# Patient Record
Sex: FEMALE | Race: WHITE | NOT HISPANIC OR LATINO
[De-identification: names, ages, dates, MRNs, and addresses within clinical notes are randomized per-mention and may not be internally consistent; named-entity substitution may affect disease eponyms.]

---

## 2017-11-10 ENCOUNTER — APPOINTMENT (OUTPATIENT)
Dept: ULTRASOUND IMAGING | Facility: HOSPITAL | Age: 82
End: 2017-11-10
Payer: MEDICARE

## 2017-11-10 ENCOUNTER — OUTPATIENT (OUTPATIENT)
Dept: OUTPATIENT SERVICES | Facility: HOSPITAL | Age: 82
LOS: 1 days | End: 2017-11-10
Payer: MEDICARE

## 2017-11-10 ENCOUNTER — APPOINTMENT (OUTPATIENT)
Dept: MAMMOGRAPHY | Facility: HOSPITAL | Age: 82
End: 2017-11-10
Payer: MEDICARE

## 2017-11-10 PROCEDURE — 76641 ULTRASOUND BREAST COMPLETE: CPT

## 2017-11-10 PROCEDURE — 77067 SCR MAMMO BI INCL CAD: CPT

## 2017-11-10 PROCEDURE — 77063 BREAST TOMOSYNTHESIS BI: CPT

## 2017-11-10 PROCEDURE — 76641 ULTRASOUND BREAST COMPLETE: CPT | Mod: 26

## 2017-11-10 PROCEDURE — 77063 BREAST TOMOSYNTHESIS BI: CPT | Mod: 26

## 2017-11-10 PROCEDURE — G0202: CPT | Mod: 26

## 2019-04-22 ENCOUNTER — APPOINTMENT (OUTPATIENT)
Dept: ULTRASOUND IMAGING | Facility: HOSPITAL | Age: 84
End: 2019-04-22

## 2019-04-22 ENCOUNTER — APPOINTMENT (OUTPATIENT)
Dept: MAMMOGRAPHY | Facility: HOSPITAL | Age: 84
End: 2019-04-22
Payer: MEDICARE

## 2019-04-22 ENCOUNTER — OUTPATIENT (OUTPATIENT)
Dept: OUTPATIENT SERVICES | Facility: HOSPITAL | Age: 84
LOS: 1 days | End: 2019-04-22
Payer: MEDICARE

## 2019-04-22 DIAGNOSIS — Z00.8 ENCOUNTER FOR OTHER GENERAL EXAMINATION: ICD-10-CM

## 2019-04-22 PROCEDURE — 76641 ULTRASOUND BREAST COMPLETE: CPT | Mod: 26,50

## 2019-04-22 PROCEDURE — 77067 SCR MAMMO BI INCL CAD: CPT

## 2019-04-22 PROCEDURE — 77063 BREAST TOMOSYNTHESIS BI: CPT

## 2019-04-22 PROCEDURE — 77067 SCR MAMMO BI INCL CAD: CPT | Mod: 26

## 2019-04-22 PROCEDURE — 76641 ULTRASOUND BREAST COMPLETE: CPT

## 2019-04-22 PROCEDURE — 77063 BREAST TOMOSYNTHESIS BI: CPT | Mod: 26

## 2020-11-23 ENCOUNTER — NON-APPOINTMENT (OUTPATIENT)
Age: 85
End: 2020-11-23

## 2020-11-23 LAB — SARS-COV-2 N GENE NPH QL NAA+PROBE: NOT DETECTED

## 2021-02-16 ENCOUNTER — APPOINTMENT (OUTPATIENT)
Dept: INTERNAL MEDICINE | Facility: CLINIC | Age: 86
End: 2021-02-16
Payer: MEDICARE

## 2021-02-16 ENCOUNTER — NON-APPOINTMENT (OUTPATIENT)
Age: 86
End: 2021-02-16

## 2021-02-16 VITALS
WEIGHT: 104 LBS | HEIGHT: 62 IN | BODY MASS INDEX: 19.14 KG/M2 | DIASTOLIC BLOOD PRESSURE: 82 MMHG | HEART RATE: 76 BPM | SYSTOLIC BLOOD PRESSURE: 128 MMHG | TEMPERATURE: 96.8 F | OXYGEN SATURATION: 99 %

## 2021-02-16 DIAGNOSIS — Z83.2 FAMILY HISTORY OF DISEASES OF THE BLOOD AND BLOOD-FORMING ORGANS AND CERTAIN DISORDERS INVOLVING THE IMMUNE MECHANISM: ICD-10-CM

## 2021-02-16 DIAGNOSIS — D68.51 ACTIVATED PROTEIN C RESISTANCE: ICD-10-CM

## 2021-02-16 DIAGNOSIS — E55.9 VITAMIN D DEFICIENCY, UNSPECIFIED: ICD-10-CM

## 2021-02-16 DIAGNOSIS — Z82.49 FAMILY HISTORY OF ISCHEMIC HEART DISEASE AND OTHER DISEASES OF THE CIRCULATORY SYSTEM: ICD-10-CM

## 2021-02-16 DIAGNOSIS — Z80.3 FAMILY HISTORY OF MALIGNANT NEOPLASM OF BREAST: ICD-10-CM

## 2021-02-16 DIAGNOSIS — Z82.69 FAMILY HISTORY OF OTHER DISEASES OF THE MUSCULOSKELETAL SYSTEM AND CONNECTIVE TISSUE: ICD-10-CM

## 2021-02-16 DIAGNOSIS — Z80.0 FAMILY HISTORY OF MALIGNANT NEOPLASM OF DIGESTIVE ORGANS: ICD-10-CM

## 2021-02-16 DIAGNOSIS — E53.8 DEFICIENCY OF OTHER SPECIFIED B GROUP VITAMINS: ICD-10-CM

## 2021-02-16 LAB
25(OH)D3 SERPL-MCNC: 66.8 NG/ML
ALBUMIN SERPL ELPH-MCNC: 4.6 G/DL
ALP BLD-CCNC: 74 U/L
ALT SERPL-CCNC: 22 U/L
ANION GAP SERPL CALC-SCNC: 12 MMOL/L
APPEARANCE: CLEAR
AST SERPL-CCNC: 24 U/L
BACTERIA: NEGATIVE
BASOPHILS # BLD AUTO: 0.06 K/UL
BASOPHILS NFR BLD AUTO: 1.6 %
BILIRUB SERPL-MCNC: 0.6 MG/DL
BILIRUBIN URINE: NEGATIVE
BLOOD URINE: NORMAL
BUN SERPL-MCNC: 14 MG/DL
CALCIUM SERPL-MCNC: 9.6 MG/DL
CHLORIDE SERPL-SCNC: 100 MMOL/L
CHOLEST SERPL-MCNC: 213 MG/DL
CO2 SERPL-SCNC: 28 MMOL/L
COLOR: YELLOW
CREAT SERPL-MCNC: 0.77 MG/DL
EOSINOPHIL # BLD AUTO: 0.21 K/UL
EOSINOPHIL NFR BLD AUTO: 5.6 %
GLUCOSE QUALITATIVE U: NEGATIVE
GLUCOSE SERPL-MCNC: 92 MG/DL
HCT VFR BLD CALC: 38.9 %
HDLC SERPL-MCNC: 95 MG/DL
HGB BLD-MCNC: 12 G/DL
HYALINE CASTS: 1 /LPF
IMM GRANULOCYTES NFR BLD AUTO: 0.3 %
KETONES URINE: NEGATIVE
LDLC SERPL CALC-MCNC: 108 MG/DL
LEUKOCYTE ESTERASE URINE: ABNORMAL
LYMPHOCYTES # BLD AUTO: 1.38 K/UL
LYMPHOCYTES NFR BLD AUTO: 36.5 %
MAN DIFF?: NORMAL
MCHC RBC-ENTMCNC: 27 PG
MCHC RBC-ENTMCNC: 30.8 GM/DL
MCV RBC AUTO: 87.6 FL
MICROSCOPIC-UA: NORMAL
MONOCYTES # BLD AUTO: 0.43 K/UL
MONOCYTES NFR BLD AUTO: 11.4 %
NEUTROPHILS # BLD AUTO: 1.69 K/UL
NEUTROPHILS NFR BLD AUTO: 44.6 %
NITRITE URINE: NEGATIVE
NONHDLC SERPL-MCNC: 118 MG/DL
PH URINE: 6.5
PLATELET # BLD AUTO: 222 K/UL
POTASSIUM SERPL-SCNC: 4 MMOL/L
PROT SERPL-MCNC: 6.8 G/DL
PROTEIN URINE: NORMAL
RBC # BLD: 4.44 M/UL
RBC # FLD: 14.3 %
RED BLOOD CELLS URINE: 5 /HPF
SODIUM SERPL-SCNC: 140 MMOL/L
SPECIFIC GRAVITY URINE: 1.02
SQUAMOUS EPITHELIAL CELLS: 2 /HPF
T4 SERPL-MCNC: 6.5 UG/DL
TRIGL SERPL-MCNC: 54 MG/DL
TSH SERPL-ACNC: 2.17 UIU/ML
UROBILINOGEN URINE: NORMAL
WBC # FLD AUTO: 3.78 K/UL
WHITE BLOOD CELLS URINE: 15 /HPF

## 2021-02-16 PROCEDURE — 99214 OFFICE O/P EST MOD 30 MIN: CPT

## 2021-02-16 NOTE — PHYSICAL EXAM
[Normal] : normal rate, regular rhythm, normal S1 and S2 and no murmur heard [de-identified] : bruising of ankle

## 2021-02-16 NOTE — HISTORY OF PRESENT ILLNESS
[FreeTextEntry1] : Pt here for  follow up also for a fal in her kitchen nthat happened last week. Was going to reach up and she climbed   on chair and  fell on her bottom and the chair fell on her . Not painful but   more bruising, [de-identified] : pt  also notes slight headache nd weighs   too much has pain into her right arm

## 2021-02-18 LAB
APPEARANCE: CLEAR
BACTERIA: NEGATIVE
BILIRUBIN URINE: NEGATIVE
BLOOD URINE: NEGATIVE
COLOR: COLORLESS
GLUCOSE QUALITATIVE U: NEGATIVE
HYALINE CASTS: 0 /LPF
KETONES URINE: NEGATIVE
LEUKOCYTE ESTERASE URINE: NEGATIVE
MICROSCOPIC-UA: NORMAL
NITRITE URINE: NEGATIVE
PH URINE: 7.5
PROTEIN URINE: NEGATIVE
RED BLOOD CELLS URINE: 1 /HPF
SPECIFIC GRAVITY URINE: 1.01
SQUAMOUS EPITHELIAL CELLS: 0 /HPF
UROBILINOGEN URINE: NORMAL
WHITE BLOOD CELLS URINE: 0 /HPF

## 2021-02-22 LAB — BACTERIA UR CULT: NORMAL

## 2021-02-25 PROBLEM — D68.51 FACTOR V LEIDEN: Status: ACTIVE | Noted: 2021-02-25

## 2021-02-25 PROBLEM — Z82.69 FAMILY HISTORY OF OSTEOARTHRITIS: Status: ACTIVE | Noted: 2021-02-25

## 2021-02-25 PROBLEM — Z83.2 FAMILY HISTORY OF FACTOR V DEFICIENCY: Status: ACTIVE | Noted: 2021-02-25

## 2021-02-25 PROBLEM — Z82.49 FAMILY HISTORY OF CORONARY ARTERY DISEASE: Status: ACTIVE | Noted: 2021-02-25

## 2021-02-25 PROBLEM — Z80.3 FAMILY HISTORY OF MALIGNANT NEOPLASM OF BREAST: Status: ACTIVE | Noted: 2021-02-25

## 2021-02-25 PROBLEM — Z80.0 FAMILY HISTORY OF MALIGNANT NEOPLASM OF COLON: Status: ACTIVE | Noted: 2021-02-25

## 2021-03-23 ENCOUNTER — OUTPATIENT (OUTPATIENT)
Dept: OUTPATIENT SERVICES | Facility: HOSPITAL | Age: 86
LOS: 1 days | End: 2021-03-23
Payer: MEDICARE

## 2021-03-23 ENCOUNTER — APPOINTMENT (OUTPATIENT)
Dept: MAMMOGRAPHY | Facility: HOSPITAL | Age: 86
End: 2021-03-23
Payer: MEDICARE

## 2021-03-23 ENCOUNTER — APPOINTMENT (OUTPATIENT)
Dept: ULTRASOUND IMAGING | Facility: HOSPITAL | Age: 86
End: 2021-03-23
Payer: MEDICARE

## 2021-03-23 DIAGNOSIS — Z00.8 ENCOUNTER FOR OTHER GENERAL EXAMINATION: ICD-10-CM

## 2021-03-23 PROCEDURE — 76641 ULTRASOUND BREAST COMPLETE: CPT

## 2021-03-23 PROCEDURE — 77063 BREAST TOMOSYNTHESIS BI: CPT | Mod: 26

## 2021-03-23 PROCEDURE — 77067 SCR MAMMO BI INCL CAD: CPT | Mod: 26

## 2021-03-23 PROCEDURE — 76641 ULTRASOUND BREAST COMPLETE: CPT | Mod: 26,50

## 2021-03-23 PROCEDURE — 77067 SCR MAMMO BI INCL CAD: CPT

## 2021-03-23 PROCEDURE — 77063 BREAST TOMOSYNTHESIS BI: CPT

## 2021-04-15 ENCOUNTER — APPOINTMENT (OUTPATIENT)
Dept: RADIOLOGY | Facility: CLINIC | Age: 86
End: 2021-04-15
Payer: MEDICARE

## 2021-04-15 ENCOUNTER — APPOINTMENT (OUTPATIENT)
Dept: INTERNAL MEDICINE | Facility: CLINIC | Age: 86
End: 2021-04-15
Payer: MEDICARE

## 2021-04-15 ENCOUNTER — APPOINTMENT (OUTPATIENT)
Dept: ULTRASOUND IMAGING | Facility: CLINIC | Age: 86
End: 2021-04-15
Payer: MEDICARE

## 2021-04-15 ENCOUNTER — RESULT REVIEW (OUTPATIENT)
Age: 86
End: 2021-04-15

## 2021-04-15 ENCOUNTER — OUTPATIENT (OUTPATIENT)
Dept: OUTPATIENT SERVICES | Facility: HOSPITAL | Age: 86
LOS: 1 days | End: 2021-04-15
Payer: MEDICARE

## 2021-04-15 VITALS
OXYGEN SATURATION: 96 % | WEIGHT: 103 LBS | DIASTOLIC BLOOD PRESSURE: 76 MMHG | BODY MASS INDEX: 18.95 KG/M2 | HEART RATE: 76 BPM | HEIGHT: 62 IN | SYSTOLIC BLOOD PRESSURE: 124 MMHG | TEMPERATURE: 97.5 F | RESPIRATION RATE: 16 BRPM

## 2021-04-15 DIAGNOSIS — M79.605 PAIN IN LEFT LEG: ICD-10-CM

## 2021-04-15 DIAGNOSIS — Z00.8 ENCOUNTER FOR OTHER GENERAL EXAMINATION: ICD-10-CM

## 2021-04-15 PROCEDURE — 93971 EXTREMITY STUDY: CPT | Mod: 26,LT

## 2021-04-15 PROCEDURE — 73610 X-RAY EXAM OF ANKLE: CPT | Mod: 26,LT

## 2021-04-15 PROCEDURE — 73590 X-RAY EXAM OF LOWER LEG: CPT | Mod: 26,LT

## 2021-04-15 PROCEDURE — 99214 OFFICE O/P EST MOD 30 MIN: CPT

## 2021-04-15 PROCEDURE — 73590 X-RAY EXAM OF LOWER LEG: CPT

## 2021-04-15 PROCEDURE — 93971 EXTREMITY STUDY: CPT

## 2021-04-15 PROCEDURE — 73610 X-RAY EXAM OF ANKLE: CPT

## 2021-04-15 NOTE — PHYSICAL EXAM
[Normal] : no respiratory distress, lungs were clear to auscultation bilaterally and no accessory muscle use [Normal Rate] : normal rate  [Regular Rhythm] : with a regular rhythm [de-identified] : _homans neg, +varicosities [de-identified] : tenderness mid tibial region.  + swelling L lateral malleolus.

## 2021-04-15 NOTE — ADDENDUM
[FreeTextEntry1] : \par Left message xray no fracture and doppler negative for dvt.  Advised to see Ortho if symptoms persist.

## 2021-04-15 NOTE — HISTORY OF PRESENT ILLNESS
[FreeTextEntry1] : leg pain [de-identified] : \par Fell 2 months ago- started to climb onto chair but lost balance and chair fell onto her left lower leg.  She continues to have intermittent pain  which can awaken her at night  to L lower leg.  Able to ambulate and bear weight without difficulty. No fevers, chills .  Using Advil prn.      \par UA previously had showed some RBCs.  Repeat Urine tests no RBCs.  \par she will be traveling to [place] in May and requires a Covid swab prior to travel.

## 2021-05-02 LAB — SARS-COV-2 N GENE NPH QL NAA+PROBE: NOT DETECTED

## 2021-05-03 ENCOUNTER — NON-APPOINTMENT (OUTPATIENT)
Age: 86
End: 2021-05-03

## 2021-10-19 LAB
25(OH)D3 SERPL-MCNC: 41.9 NG/ML
ALBUMIN SERPL ELPH-MCNC: 4.7 G/DL
ALP BLD-CCNC: 77 U/L
ALT SERPL-CCNC: 16 U/L
ANION GAP SERPL CALC-SCNC: 10 MMOL/L
APPEARANCE: CLEAR
AST SERPL-CCNC: 21 U/L
BACTERIA: NEGATIVE
BASOPHILS # BLD AUTO: 0.06 K/UL
BASOPHILS NFR BLD AUTO: 1.4 %
BILIRUB SERPL-MCNC: 0.5 MG/DL
BILIRUBIN URINE: NEGATIVE
BLOOD URINE: NEGATIVE
BUN SERPL-MCNC: 16 MG/DL
CALCIUM SERPL-MCNC: 9.7 MG/DL
CHLORIDE SERPL-SCNC: 102 MMOL/L
CHOLEST SERPL-MCNC: 218 MG/DL
CO2 SERPL-SCNC: 31 MMOL/L
COLOR: NORMAL
CREAT SERPL-MCNC: 0.6 MG/DL
EOSINOPHIL # BLD AUTO: 0.15 K/UL
EOSINOPHIL NFR BLD AUTO: 3.6 %
ESTIMATED AVERAGE GLUCOSE: 114 MG/DL
FOLATE SERPL-MCNC: 14.9 NG/ML
GLUCOSE QUALITATIVE U: NEGATIVE
GLUCOSE SERPL-MCNC: 98 MG/DL
HBA1C MFR BLD HPLC: 5.6 %
HCT VFR BLD CALC: 40.3 %
HDLC SERPL-MCNC: 89 MG/DL
HGB BLD-MCNC: 12 G/DL
HYALINE CASTS: 0 /LPF
IMM GRANULOCYTES NFR BLD AUTO: 0.2 %
KETONES URINE: NEGATIVE
LDLC SERPL CALC-MCNC: 115 MG/DL
LEUKOCYTE ESTERASE URINE: ABNORMAL
LYMPHOCYTES # BLD AUTO: 1.63 K/UL
LYMPHOCYTES NFR BLD AUTO: 38.9 %
MAN DIFF?: NORMAL
MCHC RBC-ENTMCNC: 26.8 PG
MCHC RBC-ENTMCNC: 29.8 GM/DL
MCV RBC AUTO: 90 FL
MICROSCOPIC-UA: NORMAL
MONOCYTES # BLD AUTO: 0.45 K/UL
MONOCYTES NFR BLD AUTO: 10.7 %
NEUTROPHILS # BLD AUTO: 1.89 K/UL
NEUTROPHILS NFR BLD AUTO: 45.2 %
NITRITE URINE: NEGATIVE
NONHDLC SERPL-MCNC: 129 MG/DL
PH URINE: 7
PLATELET # BLD AUTO: 233 K/UL
POTASSIUM SERPL-SCNC: 4 MMOL/L
PROT SERPL-MCNC: 6.7 G/DL
PROTEIN URINE: NEGATIVE
RBC # BLD: 4.48 M/UL
RBC # FLD: 14.2 %
RED BLOOD CELLS URINE: 5 /HPF
SODIUM SERPL-SCNC: 144 MMOL/L
SPECIFIC GRAVITY URINE: 1.02
SQUAMOUS EPITHELIAL CELLS: 1 /HPF
T4 SERPL-MCNC: 6.5 UG/DL
TRIGL SERPL-MCNC: 69 MG/DL
TSH SERPL-ACNC: 2.48 UIU/ML
UROBILINOGEN URINE: NORMAL
VIT B12 SERPL-MCNC: 521 PG/ML
WBC # FLD AUTO: 4.19 K/UL
WHITE BLOOD CELLS URINE: 12 /HPF

## 2021-10-25 ENCOUNTER — APPOINTMENT (OUTPATIENT)
Dept: INTERNAL MEDICINE | Facility: CLINIC | Age: 86
End: 2021-10-25
Payer: MEDICARE

## 2021-10-25 VITALS
HEART RATE: 84 BPM | BODY MASS INDEX: 19.32 KG/M2 | WEIGHT: 105 LBS | HEIGHT: 62 IN | TEMPERATURE: 99.2 F | SYSTOLIC BLOOD PRESSURE: 122 MMHG | OXYGEN SATURATION: 98 % | DIASTOLIC BLOOD PRESSURE: 60 MMHG

## 2021-10-25 DIAGNOSIS — R31.9 HEMATURIA, UNSPECIFIED: ICD-10-CM

## 2021-10-25 PROCEDURE — 90662 IIV NO PRSV INCREASED AG IM: CPT

## 2021-10-25 PROCEDURE — G0008: CPT

## 2021-10-25 PROCEDURE — 99214 OFFICE O/P EST MOD 30 MIN: CPT | Mod: 25

## 2021-10-25 NOTE — PHYSICAL EXAM
[No Edema] : there was no peripheral edema [Normal] : no acute distress, well nourished, well developed and well-appearing

## 2021-10-26 ENCOUNTER — NON-APPOINTMENT (OUTPATIENT)
Age: 86
End: 2021-10-26

## 2021-10-26 LAB
APPEARANCE: CLEAR
BACTERIA: NEGATIVE
BILIRUBIN URINE: NEGATIVE
BLOOD URINE: NEGATIVE
COLOR: NORMAL
GLUCOSE QUALITATIVE U: NEGATIVE
HYALINE CASTS: 0 /LPF
KETONES URINE: NEGATIVE
LEUKOCYTE ESTERASE URINE: NEGATIVE
MICROSCOPIC-UA: NORMAL
NITRITE URINE: NEGATIVE
PH URINE: 5.5
PROTEIN URINE: NEGATIVE
RED BLOOD CELLS URINE: 2 /HPF
SPECIFIC GRAVITY URINE: 1.01
SQUAMOUS EPITHELIAL CELLS: 0 /HPF
UROBILINOGEN URINE: NORMAL
WHITE BLOOD CELLS URINE: 0 /HPF

## 2021-10-27 ENCOUNTER — NON-APPOINTMENT (OUTPATIENT)
Age: 86
End: 2021-10-27

## 2021-10-28 LAB — BACTERIA UR CULT: NORMAL

## 2021-11-12 ENCOUNTER — APPOINTMENT (OUTPATIENT)
Dept: INTERNAL MEDICINE | Facility: CLINIC | Age: 86
End: 2021-11-12
Payer: MEDICARE

## 2021-11-12 PROCEDURE — 0013A: CPT

## 2021-11-23 ENCOUNTER — APPOINTMENT (OUTPATIENT)
Dept: RADIOLOGY | Facility: CLINIC | Age: 86
End: 2021-11-23
Payer: MEDICARE

## 2021-11-23 ENCOUNTER — OUTPATIENT (OUTPATIENT)
Dept: OUTPATIENT SERVICES | Facility: HOSPITAL | Age: 86
LOS: 1 days | End: 2021-11-23
Payer: MEDICARE

## 2021-11-23 DIAGNOSIS — Z00.00 ENCOUNTER FOR GENERAL ADULT MEDICAL EXAMINATION WITHOUT ABNORMAL FINDINGS: ICD-10-CM

## 2021-11-23 PROCEDURE — 77085 DXA BONE DENSITY AXL VRT FX: CPT | Mod: 26

## 2021-11-23 PROCEDURE — 77085 DXA BONE DENSITY AXL VRT FX: CPT

## 2021-11-24 ENCOUNTER — NON-APPOINTMENT (OUTPATIENT)
Age: 86
End: 2021-11-24

## 2021-12-12 LAB — SARS-COV-2 N GENE NPH QL NAA+PROBE: NOT DETECTED

## 2021-12-13 ENCOUNTER — NON-APPOINTMENT (OUTPATIENT)
Age: 86
End: 2021-12-13

## 2022-04-21 LAB
25(OH)D3 SERPL-MCNC: 35.8 NG/ML
ALBUMIN SERPL ELPH-MCNC: 5 G/DL
ALP BLD-CCNC: 78 U/L
ALT SERPL-CCNC: 17 U/L
ANION GAP SERPL CALC-SCNC: 12 MMOL/L
AST SERPL-CCNC: 23 U/L
BASOPHILS # BLD AUTO: 0.05 K/UL
BASOPHILS NFR BLD AUTO: 0.9 %
BILIRUB SERPL-MCNC: 0.4 MG/DL
BUN SERPL-MCNC: 16 MG/DL
CALCIUM SERPL-MCNC: 9.6 MG/DL
CHLORIDE SERPL-SCNC: 103 MMOL/L
CHOLEST SERPL-MCNC: 218 MG/DL
CO2 SERPL-SCNC: 28 MMOL/L
CREAT SERPL-MCNC: 0.64 MG/DL
EGFR: 87 ML/MIN/1.73M2
EOSINOPHIL # BLD AUTO: 0.08 K/UL
EOSINOPHIL NFR BLD AUTO: 1.4 %
GLUCOSE SERPL-MCNC: 97 MG/DL
HCT VFR BLD CALC: 40.6 %
HDLC SERPL-MCNC: 101 MG/DL
HGB BLD-MCNC: 12.6 G/DL
IMM GRANULOCYTES NFR BLD AUTO: 0.4 %
LDLC SERPL CALC-MCNC: 107 MG/DL
LYMPHOCYTES # BLD AUTO: 1.45 K/UL
LYMPHOCYTES NFR BLD AUTO: 25.5 %
MAN DIFF?: NORMAL
MCHC RBC-ENTMCNC: 27.2 PG
MCHC RBC-ENTMCNC: 31 GM/DL
MCV RBC AUTO: 87.5 FL
MONOCYTES # BLD AUTO: 0.41 K/UL
MONOCYTES NFR BLD AUTO: 7.2 %
NEUTROPHILS # BLD AUTO: 3.67 K/UL
NEUTROPHILS NFR BLD AUTO: 64.6 %
NONHDLC SERPL-MCNC: 117 MG/DL
PLATELET # BLD AUTO: 224 K/UL
POTASSIUM SERPL-SCNC: 3.8 MMOL/L
PROT SERPL-MCNC: 7.2 G/DL
RBC # BLD: 4.64 M/UL
RBC # FLD: 14.2 %
SODIUM SERPL-SCNC: 143 MMOL/L
T4 SERPL-MCNC: 6.7 UG/DL
TRIGL SERPL-MCNC: 49 MG/DL
TSH SERPL-ACNC: 1.44 UIU/ML
VIT B12 SERPL-MCNC: 660 PG/ML
WBC # FLD AUTO: 5.68 K/UL

## 2022-04-24 LAB
APPEARANCE: CLEAR
BACTERIA: NEGATIVE
BILIRUBIN URINE: NEGATIVE
BLOOD URINE: ABNORMAL
COLOR: YELLOW
GLUCOSE QUALITATIVE U: NEGATIVE
HCV AB SER QL: NONREACTIVE
HCV S/CO RATIO: 0.11 S/CO
HYALINE CASTS: 0 /LPF
KETONES URINE: NEGATIVE
LEUKOCYTE ESTERASE URINE: ABNORMAL
MICROSCOPIC-UA: NORMAL
NITRITE URINE: NEGATIVE
PH URINE: 6
PROTEIN URINE: NORMAL
RED BLOOD CELLS URINE: 5 /HPF
SPECIFIC GRAVITY URINE: 1.02
SQUAMOUS EPITHELIAL CELLS: 1 /HPF
UROBILINOGEN URINE: NORMAL
WHITE BLOOD CELLS URINE: 17 /HPF

## 2022-04-25 LAB — BACTERIA UR CULT: ABNORMAL

## 2022-04-26 ENCOUNTER — NON-APPOINTMENT (OUTPATIENT)
Age: 87
End: 2022-04-26

## 2022-04-26 ENCOUNTER — APPOINTMENT (OUTPATIENT)
Dept: INTERNAL MEDICINE | Facility: CLINIC | Age: 87
End: 2022-04-26
Payer: MEDICARE

## 2022-04-26 VITALS
DIASTOLIC BLOOD PRESSURE: 88 MMHG | SYSTOLIC BLOOD PRESSURE: 142 MMHG | BODY MASS INDEX: 19.32 KG/M2 | HEART RATE: 84 BPM | WEIGHT: 105 LBS | OXYGEN SATURATION: 97 % | HEIGHT: 62 IN | TEMPERATURE: 98.7 F

## 2022-04-26 PROCEDURE — 0013A: CPT

## 2022-04-26 RX ORDER — ACETAMINOPHEN/DIPHENHYDRAMINE 500MG-25MG
1000 TABLET ORAL DAILY
Refills: 0 | Status: DISCONTINUED | COMMUNITY
End: 2022-04-26

## 2022-04-26 NOTE — HEALTH RISK ASSESSMENT
[Never] : Never [Yes] : Yes [4 or more  times a week (4 pts)] : 4 or more  times a week (4 points) [1 or 2 (0 pts)] : 1 or 2 (0 points) [Never (0 pts)] : Never (0 points) [No] : In the past 12 months have you used drugs other than those required for medical reasons? No [0] : 2) Feeling down, depressed, or hopeless: Not at all (0) [No falls in past year] : Patient reported no falls in the past year [PHQ-2 Negative - No further assessment needed] : PHQ-2 Negative - No further assessment needed [de-identified] : no [de-identified] : reg [[id number]] : 0 [EyeExamDate] : 2021 [Alone] : lives alone [Fully functional (bathing, dressing, toileting, transferring, walking, feeding)] : Fully functional (bathing, dressing, toileting, transferring, walking, feeding) [Fully functional (using the telephone, shopping, preparing meals, housekeeping, doing laundry, using] : Fully functional and needs no help or supervision to perform IADLs (using the telephone, shopping, preparing meals, housekeeping, doing laundry, using transportation, managing medications and managing finances) [Reports changes in hearing] : Reports no changes in hearing [Smoke Detector] : smoke detector [Carbon Monoxide Detector] : carbon monoxide detector [Seat Belt] :  uses seat belt [Sunscreen] : uses sunscreen [MammogramDate] : 2021 [BoneDensityDate] : 11/2021 [ColonoscopyDate] : 2002 [With Patient/Caregiver] : , with patient/caregiver [AdvancecareDate] : 4/26/22

## 2022-04-28 LAB
APPEARANCE: CLEAR
BACTERIA: NEGATIVE
BILIRUBIN URINE: NEGATIVE
BLOOD URINE: NEGATIVE
COLOR: NORMAL
GLUCOSE QUALITATIVE U: NEGATIVE
HYALINE CASTS: 0 /LPF
KETONES URINE: NEGATIVE
LEUKOCYTE ESTERASE URINE: NEGATIVE
MICROSCOPIC-UA: NORMAL
NITRITE URINE: NEGATIVE
PH URINE: 6.5
PROTEIN URINE: NEGATIVE
RED BLOOD CELLS URINE: 1 /HPF
SPECIFIC GRAVITY URINE: 1.01
SQUAMOUS EPITHELIAL CELLS: 0 /HPF
UROBILINOGEN URINE: NORMAL
WHITE BLOOD CELLS URINE: 1 /HPF

## 2022-05-01 LAB — BACTERIA UR CULT: NORMAL

## 2022-06-22 ENCOUNTER — APPOINTMENT (OUTPATIENT)
Dept: ULTRASOUND IMAGING | Facility: HOSPITAL | Age: 87
End: 2022-06-22
Payer: MEDICARE

## 2022-06-22 ENCOUNTER — OUTPATIENT (OUTPATIENT)
Dept: OUTPATIENT SERVICES | Facility: HOSPITAL | Age: 87
LOS: 1 days | End: 2022-06-22
Payer: MEDICARE

## 2022-06-22 ENCOUNTER — APPOINTMENT (OUTPATIENT)
Dept: MAMMOGRAPHY | Facility: HOSPITAL | Age: 87
End: 2022-06-22
Payer: MEDICARE

## 2022-06-22 DIAGNOSIS — Z00.8 ENCOUNTER FOR OTHER GENERAL EXAMINATION: ICD-10-CM

## 2022-06-22 PROCEDURE — 77067 SCR MAMMO BI INCL CAD: CPT | Mod: 26

## 2022-06-22 PROCEDURE — 77063 BREAST TOMOSYNTHESIS BI: CPT | Mod: 26

## 2022-06-22 PROCEDURE — 77067 SCR MAMMO BI INCL CAD: CPT

## 2022-06-22 PROCEDURE — 76641 ULTRASOUND BREAST COMPLETE: CPT

## 2022-06-22 PROCEDURE — 76641 ULTRASOUND BREAST COMPLETE: CPT | Mod: 26,50

## 2022-06-22 PROCEDURE — 77063 BREAST TOMOSYNTHESIS BI: CPT

## 2022-12-20 ENCOUNTER — APPOINTMENT (OUTPATIENT)
Dept: ENDOCRINOLOGY | Facility: CLINIC | Age: 87
End: 2022-12-20

## 2023-01-20 ENCOUNTER — APPOINTMENT (OUTPATIENT)
Dept: INTERNAL MEDICINE | Facility: CLINIC | Age: 88
End: 2023-01-20
Payer: MEDICARE

## 2023-01-20 VITALS
SYSTOLIC BLOOD PRESSURE: 124 MMHG | TEMPERATURE: 97.9 F | OXYGEN SATURATION: 97 % | HEIGHT: 62 IN | HEART RATE: 86 BPM | DIASTOLIC BLOOD PRESSURE: 64 MMHG | WEIGHT: 105 LBS | BODY MASS INDEX: 19.32 KG/M2

## 2023-01-20 DIAGNOSIS — M54.50 LOW BACK PAIN, UNSPECIFIED: ICD-10-CM

## 2023-01-20 PROCEDURE — 99215 OFFICE O/P EST HI 40 MIN: CPT

## 2023-01-22 ENCOUNTER — TRANSCRIPTION ENCOUNTER (OUTPATIENT)
Age: 88
End: 2023-01-22

## 2023-01-24 DIAGNOSIS — Z11.52 ENCOUNTER FOR SCREENING FOR COVID-19: ICD-10-CM

## 2023-01-25 ENCOUNTER — APPOINTMENT (OUTPATIENT)
Dept: INTERNAL MEDICINE | Facility: CLINIC | Age: 88
End: 2023-01-25
Payer: MEDICARE

## 2023-01-25 ENCOUNTER — NON-APPOINTMENT (OUTPATIENT)
Age: 88
End: 2023-01-25

## 2023-01-25 VITALS
BODY MASS INDEX: 19.32 KG/M2 | TEMPERATURE: 98.8 F | SYSTOLIC BLOOD PRESSURE: 142 MMHG | OXYGEN SATURATION: 98 % | HEART RATE: 81 BPM | HEIGHT: 62 IN | WEIGHT: 105 LBS | DIASTOLIC BLOOD PRESSURE: 72 MMHG

## 2023-01-25 LAB
ANION GAP SERPL CALC-SCNC: 11 MMOL/L
BASOPHILS # BLD AUTO: 0.05 K/UL
BASOPHILS NFR BLD AUTO: 1 %
BUN SERPL-MCNC: 23 MG/DL
CALCIUM SERPL-MCNC: 9.8 MG/DL
CHLORIDE SERPL-SCNC: 104 MMOL/L
CO2 SERPL-SCNC: 29 MMOL/L
CREAT SERPL-MCNC: 0.68 MG/DL
EGFR: 85 ML/MIN/1.73M2
EOSINOPHIL # BLD AUTO: 0.16 K/UL
EOSINOPHIL NFR BLD AUTO: 3 %
GLUCOSE SERPL-MCNC: 90 MG/DL
HCT VFR BLD CALC: 36.1 %
HGB BLD-MCNC: 11.7 G/DL
IMM GRANULOCYTES NFR BLD AUTO: 0.4 %
LYMPHOCYTES # BLD AUTO: 1.54 K/UL
LYMPHOCYTES NFR BLD AUTO: 29.3 %
MAN DIFF?: NORMAL
MCHC RBC-ENTMCNC: 27.2 PG
MCHC RBC-ENTMCNC: 32.4 GM/DL
MCV RBC AUTO: 84 FL
MONOCYTES # BLD AUTO: 0.49 K/UL
MONOCYTES NFR BLD AUTO: 9.3 %
NEUTROPHILS # BLD AUTO: 2.99 K/UL
NEUTROPHILS NFR BLD AUTO: 57 %
PLATELET # BLD AUTO: 233 K/UL
POTASSIUM SERPL-SCNC: 4.2 MMOL/L
RBC # BLD: 4.3 M/UL
RBC # FLD: 14.6 %
SODIUM SERPL-SCNC: 145 MMOL/L
WBC # FLD AUTO: 5.25 K/UL

## 2023-01-25 PROCEDURE — 93000 ELECTROCARDIOGRAM COMPLETE: CPT

## 2023-01-25 PROCEDURE — 99214 OFFICE O/P EST MOD 30 MIN: CPT | Mod: 25

## 2023-01-25 NOTE — HISTORY OF PRESENT ILLNESS
[No Adverse Anesthesia Reaction] : no adverse anesthesia reaction in self or family member [(Patient denies any chest pain, claudication, dyspnea on exertion, orthopnea, palpitations or syncope)] : Patient denies any chest pain, claudication, dyspnea on exertion, orthopnea, palpitations or syncope [Family Member] : no family member with adverse anesthesia reaction/sudden death [Self] : no previous adverse anesthesia reaction [FreeTextEntry1] : Left Cataract Repair [FreeTextEntry2] : 1/30/23 [FreeTextEntry3] : Dr.Aimee Cruz [FreeTextEntry4] : Ms. FADY HARPER is a 87 year F who comes in for a preoperative evaluation.\par Pt with history of vitamin b12 deficiency, vitamin d deficiency comes in for preop visit for Cataract repair. \par Pt recently traveled to [place] and saw doctor there for her hx of low back pain and recommended PT. \par Patient denies any cp, sob,abdominal pain, nausea, vomiting, palpitations, fever, chills, constipation, diarrhea.\par \par Anesthesia History: Ms. FADY HARPER has had no adverse effects to anesthesia in the past. \par \par Functional Capacity-Walking 1-2 blocks or climbing stairs symptoms: Ms. FADY HARPER denies any symptoms of chest pain, HACKETT, SOB or palpitations.\par \par

## 2023-01-25 NOTE — REVIEW OF SYSTEMS
[Negative] : Neurological [Fever] : no fever [Chills] : no chills [Fatigue] : no fatigue [FreeTextEntry3] : see HPi

## 2023-01-25 NOTE — HISTORY OF PRESENT ILLNESS
[FreeTextEntry1] : f/up  [de-identified] : Pt is a 87 yr. old female here for preop ECG prior to upcoming cataract surgery on 1/30/23. \par \par She has a h/ of Vit B12 deficiency, Factor  Leiden, aortic stenosis, cervical radiculopathy. \par Pt feels well. She denies chest pain, palpitations, abdominal pain, Shortness of breath. \par

## 2023-01-25 NOTE — ASSESSMENT
[Patient Optimized for Surgery] : Patient optimized for surgery [As per surgery] : as per surgery [FreeTextEntry4] : Patient is moderate risk for low risk procedure \par \par Patient advised to hold aspirin, all NSAIDs including Motrin, naproxen, Aleve, ibuprofen, Advil and fish oil 7 days prior to surgery.\par Adequate oxygen monitoring. DVT prophylaxis.\par Continue current medications as directed.\par Covid 19 nasopharyngeal swab per Surgery.\par

## 2023-01-25 NOTE — RESULTS/DATA
[] : results reviewed [de-identified] : RSR 77, No change from previous ECG on 4/26/22, reviewed by Dr. Del Cid

## 2023-01-25 NOTE — DATA REVIEWED
[FreeTextEntry1] : ECG - RSR 72, No acute Sr/ t wave changes, No change from previous ECG on 4/26/22. \par Reviewed by Dr Del Cid.

## 2023-01-27 ENCOUNTER — TRANSCRIPTION ENCOUNTER (OUTPATIENT)
Age: 88
End: 2023-01-27

## 2023-04-27 ENCOUNTER — APPOINTMENT (OUTPATIENT)
Dept: INTERNAL MEDICINE | Facility: CLINIC | Age: 88
End: 2023-04-27

## 2023-06-06 ENCOUNTER — APPOINTMENT (OUTPATIENT)
Dept: ORTHOPEDIC SURGERY | Facility: CLINIC | Age: 88
End: 2023-06-06
Payer: MEDICARE

## 2023-06-06 VITALS
WEIGHT: 105 LBS | HEART RATE: 81 BPM | DIASTOLIC BLOOD PRESSURE: 75 MMHG | SYSTOLIC BLOOD PRESSURE: 159 MMHG | TEMPERATURE: 98.6 F | BODY MASS INDEX: 19.83 KG/M2 | HEIGHT: 61 IN

## 2023-06-06 DIAGNOSIS — M72.0 PALMAR FASCIAL FIBROMATOSIS [DUPUYTREN]: ICD-10-CM

## 2023-06-06 DIAGNOSIS — R53.1 WEAKNESS: ICD-10-CM

## 2023-06-06 PROCEDURE — 99204 OFFICE O/P NEW MOD 45 MIN: CPT

## 2023-06-07 NOTE — DISCUSSION/SUMMARY
[FreeTextEntry1] : Patient seen today with complaint of weakness in the left hand.  Patient also describes a number of other neurologic/musculoskeletal issues.  Patient is aware of tremor.  Patient describes stumbling and falling.\par Patient reports that she had consulted Elpidio Greene MD, and was particularly interested whether she had Parkinson's disease.  Patient reports that she was advised that Dr. Greene could not find an explanation for the weakness and falling and referred her to other physicians for evaluation.\par Even so, the patient continues to be concerned that she might have a neurologic disorder such as Parkinson's disease.\par \par On exam the patient has no evidence of hand or wrist musculoskeletal deficits in terms of bones, joints, ligaments.  There is no evidence of peripheral nerve entrapment.\par \par Notable finding on exam is asymmetrical rigidity, evidence of cogwheeling, also asymmetrical, and slowed rapid alternating movements.  I am not a neurologist but I am concerned that she may have some neurological disorder which is the primary explanation for her loss of strength and stumbling.  Patient may have myelopathy, spinal cord disorder, or a movement disorder.\par I have urged the patient to obtain a second opinion neurology consultation, and the patient agrees to do so.\par Patient has been seeing physicians in [place], and I recommend and refer the patient to Dr. Gamble and her associates for neurologic consultation and treatment.\par I have communicated by task with Dr. Gagnon who will arrange appointment for this patient.\par \par In excess of 60 minutes spent in care, treatment, conferencing, counseling, coordination of care.\par \par Prognosis uncertain.\par A lengthy and detailed discussion was held with the patient regarding analysis, treatment, and recommendations. All questions have been answered. At the conclusion the patient expressed acceptance, understanding and agreement with the plan.

## 2023-06-07 NOTE — PHYSICAL EXAM
[de-identified] : NEUROLOGIC:\par Patient is fully mentally alert, oriented, attentive, with complete understanding.\par Answers all questions appropriately.\par \par Gait: Not shuffling\par Patient does not swing the left arm\par \par Upper extremity muscle tone\par Rigidity bilaterally, greater on left than right\par Testing for cogwheeling: Positive on left side\par Right much less\par \par Rapid alternating movements\par Asymmetrical\par Left hand slower than right\par \par Overall muscle strength appears to be 5/5, given small size, BMI 19, and age\par Sensation is intact all digits\par Phalen's test with median nerve compression: Negative bilaterally\par Radial nerve motor and sensory and ulnar nerve motor and sensory are intact.\par \par MSK exam: \par Left hand\par No A1 pulley tenderness and no triggering in any finger.\par Basal joint prominent with adduction, stiffness, crepitus no pain\par No pertinent MP, PIP, or DIP joint contributory findings, except some Heberden's nodes; none are clinically painful.\par \par MSK exam:\par Right wrist:\par Good motion no localizing findings.\par Right hand:\par No A1 pulley tenderness and no triggering in any finger.\par No pertinent MP, PIP, or DIP joint contributory findings, except some Heberden's nodes; none are clinically painful.\par \par Skin: No cyanosis, clubbing, edema or rashes.\par Vascular: Radial pulses intact.\par Lymphatic: No streaking or epitrochlear adenopathy.\par The patient is awake, alert, and oriented. Affect appropriate. Cooperative.\par \par \par Skin: No cyanosis, clubbing, edema or rashes.\par Vascular: Radial pulses intact.\par Lymphatic: No streaking or epitrochlear adenopathy.\par The patient is awake, alert, and oriented. Affect appropriate. Cooperative.  [de-identified] : Deferred

## 2023-06-07 NOTE — HISTORY OF PRESENT ILLNESS
[FreeTextEntry1] : Patient is 86 yo RHD female retied  at [facility] and later from [facility], most recently seen 7.5 years ago for locking trigger finger right ring finger and for Dupuytren's nodules.\par Patient underwent release right ring trigger finger 11/11/2015 with good results.\par \par TODAY:\par Patient presents as NEW PATIENT for hand consultation.\par Describes weakness of the left hand and difficulty cutting meat with a knife\par Patient is aware of a tremor.\par Patient also has been having some trouble walking and has been stumbling.\par Patient states that she had neurological work-up by Elpidio Greene MD\par Patient specifically inquired whether she had Parkinson's disease and according to the patient was told that she does not\par However, on today's physical exam, I note that the patient has rigidity in the upper extremities left greater than right \par In addition I note on exam that there appears to be cogwheeling left greater than right\par Patient denies numbness or tingling.\par Patient denies other triggering.\par Patient has no other notable hand complaints.\par \par Social history:\par Patient lives alone.\par Patient has no relatives in [place].\par Patient is independent and drives.\par

## 2023-06-26 ENCOUNTER — APPOINTMENT (OUTPATIENT)
Dept: NEUROLOGY | Facility: CLINIC | Age: 88
End: 2023-06-26
Payer: MEDICARE

## 2023-06-26 VITALS
SYSTOLIC BLOOD PRESSURE: 145 MMHG | BODY MASS INDEX: 18.88 KG/M2 | WEIGHT: 100 LBS | HEART RATE: 96 BPM | DIASTOLIC BLOOD PRESSURE: 77 MMHG | HEIGHT: 61 IN | TEMPERATURE: 98.2 F

## 2023-06-26 PROCEDURE — 99204 OFFICE O/P NEW MOD 45 MIN: CPT

## 2023-06-26 RX ORDER — VITAMIN K2 90 MCG
125 MCG CAPSULE ORAL DAILY
Refills: 0 | Status: DISCONTINUED | COMMUNITY
End: 2023-06-26

## 2023-06-27 NOTE — CONSULT LETTER
[Dear  ___] : Dear  [unfilled], [Consult Letter:] : I had the pleasure of evaluating your patient, [unfilled]. [DrMichelle  ___] : Dr. WINSTON

## 2023-06-27 NOTE — PHYSICAL EXAM
[General Appearance - Alert] : alert [General Appearance - In No Acute Distress] : in no acute distress [Oriented To Time, Place, And Person] : oriented to person, place, and time [Impaired Insight] : insight and judgment were intact [Affect] : the affect was normal [Person] : oriented to person [Place] : oriented to place [Time] : oriented to time [Concentration Intact] : normal concentrating ability [Visual Intact] : visual attention was ~T not ~L decreased [Naming Objects] : no difficulty naming common objects [Repeating Phrases] : no difficulty repeating a phrase [Writing A Sentence] : no difficulty writing a sentence [Fluency] : fluency intact [Comprehension] : comprehension intact [Reading] : reading intact [Past History] : adequate knowledge of personal past history [Cranial Nerves Optic (II)] : visual acuity intact bilaterally,  visual fields full to confrontation, pupils equal round and reactive to light [Cranial Nerves Oculomotor (III)] : extraocular motion intact [Cranial Nerves Trigeminal (V)] : facial sensation intact symmetrically [Cranial Nerves Facial (VII)] : face symmetrical [Cranial Nerves Vestibulocochlear (VIII)] : hearing was intact bilaterally [Cranial Nerves Glossopharyngeal (IX)] : tongue and palate midline [Cranial Nerves Accessory (XI - Cranial And Spinal)] : head turning and shoulder shrug symmetric [Cranial Nerves Hypoglossal (XII)] : there was no tongue deviation with protrusion [Motor Strength] : muscle strength was normal in all four extremities [No Muscle Atrophy] : normal bulk in all four extremities [Sensation Tactile Decrease] : light touch was intact [Proprioception] : proprioception was intact [Limited Balance] : the patient's balance was impaired [Past-pointing] : there was no past-pointing [Tremor] : no tremor present [Coordination - Dysmetria Impaired Finger-to-Nose Bilateral] : not present [2+] : Patella left 2+ [0] : Ankle jerk left 0 [Plantar Reflex Right Only] : normal on the right [Plantar Reflex Left Only] : normal on the left [FreeTextEntry1] : No masklike facies, mild hypophonia, no micrographia no dysarthria\par Slight tremor of left hand at rest. Axial  muscle hypertonia\par Cogwheel rigidity and bradykinesia left upper extremity, moderate rigidity bilateral lower extremities left more than right side.\par Gait/balance: Erect posture, mildly broad-based with slight shuffling\par Retropulsion: weakly positive [PERRL With Normal Accommodation] : pupils were equal in size, round, reactive to light, with normal accommodation [Extraocular Movements] : extraocular movements were intact [Full Visual Field] : full visual field [Outer Ear] : the ears and nose were normal in appearance [Hearing Threshold Finger Rub Not [place]] : hearing was normal [Neck Cervical Mass (___cm)] : no neck mass was observed [Auscultation Breath Sounds / Voice Sounds] : lungs were clear to auscultation bilaterally [Heart Rate And Rhythm] : heart rate was normal and rhythm regular [Heart Sounds] : normal S1 and S2 [Arterial Pulses Carotid] : carotid pulses were normal with no bruits [Edema] : there was no peripheral edema [] : no rash

## 2023-06-27 NOTE — DISCUSSION/SUMMARY
[FreeTextEntry1] : 87-year-old F with PMHx of pericardial effusion, arthritis /cervical radiculopathy, vitamin B12 deficiency and factor V Leiden; presents with c/o feeling off balance, walking very slowly, at times shuffling, feels slight stiffness left than right side of her body, has had 2 falls, also has occasional tremor left > right hand, has noted significantly diminished dexterity of left hand movements. 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

## 2023-06-27 NOTE — REVIEW OF SYSTEMS
[Negative] : Heme/Lymph [Hand Weakness] :  hand weakness [Poor Coordination] : poor coordination [Frequent Falls] : frequent falls [As Noted in HPI] : as noted in HPI [Joint Pain] : joint pain [Joint Stiffness] : joint stiffness [de-identified] : rigidity LE's, tremor L>R, decreased FFM

## 2023-06-27 NOTE — REASON FOR VISIT
[Consultation] : a consultation visit [FreeTextEntry1] : Referred by Dr. Hutchinson for evaluation of possible Parkinson's disease

## 2023-06-27 NOTE — HISTORY OF PRESENT ILLNESS
[FreeTextEntry1] : 87-year-old female of [place] descent with past medical history of pericardial effusion , arthritis /cervical radiculopathy, vitamin B12 deficiency and factor V Leiden; reports that since past couple of years she has noted that she is walking very slowly, her balance is off, she also feels slight stiffness more on the left than right side of her body.  Patient reports 2 falls, once when she was home was reaching overhead lost her balance and fell sideways, the second one occurred in [place] when she was walking back to the house from outdoors, she lost her balance and fell on her side.  These falls were not associated with any dizziness, preceding aura, prodrome or LOC.  Patient also reports occasional tremor of the left more than right hand, it occurs at random, during rest or with action, she has also noted significantly diminished dexterity of left hand movements.  Patient has had surgeries on her left hand for Dupuytren's contracture previously, her current slowing of fine motor movements is significantly different and worse.  She denies dysphagia, dyspnea, her speech may be slightly lower in tone, she denies sleep disturbance, nightmares or hallucinations.  Patient denies bladder or bowel dysfunction or constipation\par \par Patient had consulted Dr. Greene the neurologist in [place] area in 6/2022, MRI of the brain done did not reveal any significant abnormality, she was told she did not have Parkinson's disease.\par \par Further history, patient also reports chronic low back pain, she relates to her age, she has noted intermittent radiation of low back pain to right lateral thigh, calf and shin

## 2023-06-27 NOTE — DATA REVIEWED
[de-identified] : 1/16/2022: MRI lumbar spine: Shallow lumbar dextroscoliosis.  At L3-4 disc bulge with mild degree of central canal stenosis and bilateral lateral recess narrowing.  At L4-5 disc bulge with left foraminal disc herniation contacts the exiting left L4 nerve root

## 2023-07-20 ENCOUNTER — OUTPATIENT (OUTPATIENT)
Dept: OUTPATIENT SERVICES | Facility: HOSPITAL | Age: 88
LOS: 1 days | End: 2023-07-20
Payer: MEDICARE

## 2023-07-20 DIAGNOSIS — R26.81 UNSTEADINESS ON FEET: ICD-10-CM

## 2023-07-20 PROCEDURE — 78803 RP LOCLZJ TUM SPECT 1 AREA: CPT | Mod: 26,MH

## 2023-07-20 PROCEDURE — A9584: CPT

## 2023-07-20 PROCEDURE — 78803 RP LOCLZJ TUM SPECT 1 AREA: CPT

## 2023-07-20 RX ORDER — IODINE/POTASSIUM IODIDE 5 %-10 %
20 SOLUTION, NON-ORAL TOPICAL ONCE
Refills: 0 | Status: DISCONTINUED | OUTPATIENT
Start: 2023-07-20 | End: 2023-08-03

## 2023-07-21 ENCOUNTER — NON-APPOINTMENT (OUTPATIENT)
Age: 88
End: 2023-07-21

## 2023-07-21 DIAGNOSIS — R26.81 UNSTEADINESS ON FEET: ICD-10-CM

## 2023-08-29 ENCOUNTER — APPOINTMENT (OUTPATIENT)
Dept: NEUROLOGY | Facility: CLINIC | Age: 88
End: 2023-08-29
Payer: MEDICARE

## 2023-08-29 VITALS
SYSTOLIC BLOOD PRESSURE: 129 MMHG | BODY MASS INDEX: 18.12 KG/M2 | HEIGHT: 61 IN | DIASTOLIC BLOOD PRESSURE: 76 MMHG | HEART RATE: 91 BPM | WEIGHT: 96 LBS

## 2023-08-29 PROCEDURE — 99214 OFFICE O/P EST MOD 30 MIN: CPT

## 2023-08-29 NOTE — REVIEW OF SYSTEMS
[Hand Weakness] :  hand weakness [Poor Coordination] : poor coordination [Frequent Falls] : frequent falls [As Noted in HPI] : as noted in HPI [Joint Pain] : joint pain [Joint Stiffness] : joint stiffness [Negative] : Heme/Lymph [de-identified] : rigidity LE's, tremor L>R, decreased FFM

## 2023-08-29 NOTE — REASON FOR VISIT
[Follow-Up: _____] : a [unfilled] follow-up visit [FreeTextEntry1] : for gait imbalance to discuss DaTscan results

## 2023-08-29 NOTE — PHYSICAL EXAM
[General Appearance - Alert] : alert [General Appearance - In No Acute Distress] : in no acute distress [Oriented To Time, Place, And Person] : oriented to person, place, and time [Impaired Insight] : insight and judgment were intact [Affect] : the affect was normal [Person] : oriented to person [Place] : oriented to place [Time] : oriented to time [Concentration Intact] : normal concentrating ability [Naming Objects] : no difficulty naming common objects [Repeating Phrases] : no difficulty repeating a phrase [Fluency] : fluency intact [Comprehension] : comprehension intact [Past History] : adequate knowledge of personal past history [Cranial Nerves Optic (II)] : visual acuity intact bilaterally,  visual fields full to confrontation, pupils equal round and reactive to light [Cranial Nerves Oculomotor (III)] : extraocular motion intact [Cranial Nerves Trigeminal (V)] : facial sensation intact symmetrically [Cranial Nerves Facial (VII)] : face symmetrical [Cranial Nerves Vestibulocochlear (VIII)] : hearing was intact bilaterally [Cranial Nerves Glossopharyngeal (IX)] : tongue and palate midline [Cranial Nerves Accessory (XI - Cranial And Spinal)] : head turning and shoulder shrug symmetric [Cranial Nerves Hypoglossal (XII)] : there was no tongue deviation with protrusion [Motor Strength] : muscle strength was normal in all four extremities [No Muscle Atrophy] : normal bulk in all four extremities [Sensation Tactile Decrease] : light touch was intact [Proprioception] : proprioception was intact [Limited Balance] : the patient's balance was impaired [2+] : Patella left 2+ [0] : Ankle jerk left 0 [PERRL With Normal Accommodation] : pupils were equal in size, round, reactive to light, with normal accommodation [Extraocular Movements] : extraocular movements were intact [Full Visual Field] : full visual field [Outer Ear] : the ears and nose were normal in appearance [Hearing Threshold Finger Rub Not [place]] : hearing was normal [Neck Cervical Mass (___cm)] : no neck mass was observed [Auscultation Breath Sounds / Voice Sounds] : lungs were clear to auscultation bilaterally [Heart Rate And Rhythm] : heart rate was normal and rhythm regular [Heart Sounds] : normal S1 and S2 [Arterial Pulses Carotid] : carotid pulses were normal with no bruits [Edema] : there was no peripheral edema [] : no rash [Registration Intact] : recent registration memory intact [Past-pointing] : there was no past-pointing [Tremor] : no tremor present [Coordination - Dysmetria Impaired Finger-to-Nose Bilateral] : not present [Plantar Reflex Right Only] : normal on the right [Plantar Reflex Left Only] : normal on the left [FreeTextEntry1] : No masklike facies, mild hypophonia, no micrographia no dysarthria\par  Slight tremor of left hand at rest. Axial  muscle hypertonia\par  Cogwheel rigidity and bradykinesia left upper extremity, moderate rigidity bilateral lower extremities left more than right side.\par  Gait/balance: Erect posture, mildly broad-based with slight shuffling\par  Retropulsion: weakly positive

## 2023-08-29 NOTE — HISTORY OF PRESENT ILLNESS
[FreeTextEntry1] : Patient is here for a follow-up visit today, last seen on 6/26/2023.  Patient reports she continues to have unstable gait, she walks slow, also has stiffness and weakness of left arm, reports occasional tremors left hand when hand is rested, also complains of neck pain.  Patient had Edmund scan negative for Parkinsons ds or Parkinsons syndrome.   Patient also reports that she continues to have pain in the right leg–right anterior thigh that radiates to right calf, it is worse at night, she has been seen by orthopedist Dr. Herron, she had MRI of lumbar spine that has demonstrated DJD/spinal stenosis

## 2023-08-29 NOTE — DISCUSSION/SUMMARY
[FreeTextEntry1] : 87-year-old F with PMHx of pericardial effusion, arthritis /cervical radiculopathy, vitamin B12 deficiency and factor V Leiden; presents with c/o feeling off balance, walking very slowly, at times shuffling, feels slight stiffness left than right side of her body, has had 2 falls, also has occasional tremor left > right hand, has noted significantly diminished dexterity of left hand movements.    # Symptoms + exam are suggestive of early Parkinson's disease left side predominant, however DaTscan is negative for Parkinson's disease/parkinsonian syndrome  -As discussed with the patient in detail, we will perform MRI of brain to rule out prior strokes/secondary parkinsonism -Discussed the option of trial with carbidopa levodopa, however patient not keen on medication trial before ruling out other etiologies -Have recommended continuing physical therapy for balance and gait training  #Gait imbalance and neck pain, rule out cervical spondylomyelopathy  -Obtain MRI cervical spine  #Lumbar DJD, possible right L4-5 radiculopathy   -Follow-up with orthopedist, may benefit from MIKA

## 2023-08-29 NOTE — DATA REVIEWED
[de-identified] : 7/20/23: Edmund scan negative for Parkinsons ds or Parkinsons syndrome. [de-identified] : 1/16/2022: MRI lumbar spine: Shallow lumbar dextroscoliosis.  At L3-4 disc bulge with mild degree of central canal stenosis and bilateral lateral recess narrowing.  At L4-5 disc bulge with left foraminal disc herniation contacts the exiting left L4 nerve root

## 2023-08-30 ENCOUNTER — APPOINTMENT (OUTPATIENT)
Dept: INTERNAL MEDICINE | Facility: CLINIC | Age: 88
End: 2023-08-30
Payer: MEDICARE

## 2023-08-30 VITALS — DIASTOLIC BLOOD PRESSURE: 68 MMHG | SYSTOLIC BLOOD PRESSURE: 132 MMHG

## 2023-08-30 DIAGNOSIS — C44.91 BASAL CELL CARCINOMA OF SKIN, UNSPECIFIED: ICD-10-CM

## 2023-08-30 DIAGNOSIS — Z23 ENCOUNTER FOR IMMUNIZATION: ICD-10-CM

## 2023-08-30 DIAGNOSIS — Z20.822 CONTACT WITH AND (SUSPECTED) EXPOSURE TO COVID-19: ICD-10-CM

## 2023-08-30 DIAGNOSIS — M81.0 AGE-RELATED OSTEOPOROSIS W/OUT CURRENT PATHOLOGICAL FRACTURE: ICD-10-CM

## 2023-08-30 DIAGNOSIS — Z63.4 DISAPPEARANCE AND DEATH OF FAMILY MEMBER: ICD-10-CM

## 2023-08-30 DIAGNOSIS — I31.39 OTHER PERICARDIAL EFFUSION (NONINFLAMMATORY): ICD-10-CM

## 2023-08-30 DIAGNOSIS — Z87.898 PERSONAL HISTORY OF OTHER SPECIFIED CONDITIONS: ICD-10-CM

## 2023-08-30 DIAGNOSIS — Z00.8 ENCOUNTER FOR OTHER GENERAL EXAMINATION: ICD-10-CM

## 2023-08-30 DIAGNOSIS — H26.9 UNSPECIFIED CATARACT: ICD-10-CM

## 2023-08-30 DIAGNOSIS — E53.8 DEFICIENCY OF OTHER SPECIFIED B GROUP VITAMINS: ICD-10-CM

## 2023-08-30 DIAGNOSIS — Z00.00 ENCOUNTER FOR GENERAL ADULT MEDICAL EXAMINATION W/OUT ABNORMAL FINDINGS: ICD-10-CM

## 2023-08-30 DIAGNOSIS — D70.9 NEUTROPENIA, UNSPECIFIED: ICD-10-CM

## 2023-08-30 DIAGNOSIS — Z01.818 ENCOUNTER FOR OTHER PREPROCEDURAL EXAMINATION: ICD-10-CM

## 2023-08-30 PROCEDURE — G0439: CPT

## 2023-08-30 PROCEDURE — 90471 IMMUNIZATION ADMIN: CPT

## 2023-08-30 PROCEDURE — 90715 TDAP VACCINE 7 YRS/> IM: CPT | Mod: GY

## 2023-08-30 SDOH — SOCIAL STABILITY - SOCIAL INSECURITY: DISSAPEARANCE AND DEATH OF FAMILY MEMBER: Z63.4

## 2023-08-30 NOTE — ASSESSMENT
[FreeTextEntry1] : All preventative measures were reviewed with the patient and the patient is due for and agrees to the following as outlined  in the plan  below. needs  flu and covid

## 2023-08-30 NOTE — HEALTH RISK ASSESSMENT
[No] : In the past 12 months have you used drugs other than those required for medical reasons? No [0] : 2) Feeling down, depressed, or hopeless: Not at all (0) [Never] : Never [One fall no injury in past year] : Patient reported one fall in the past year without injury [PHQ-2 Negative - No further assessment needed] : PHQ-2 Negative - No further assessment needed [de-identified] : walking [de-identified] : regf [[id number]] : 0 [EyeExamDate] : 1/123 [Patient declined PAP Smear] : Patient declined PAP Smear [Patient reported bone density results were abnormal] : Patient reported bone density results were abnormal [Patient declined colonoscopy] : Patient declined colonoscopy [Change in mental status noted] : No change in mental status noted [None] : None [Alone] : lives alone [] :  [Fully functional (bathing, dressing, toileting, transferring, walking, feeding)] : Fully functional (bathing, dressing, toileting, transferring, walking, feeding) [Fully functional (using the telephone, shopping, preparing meals, housekeeping, doing laundry, using] : Fully functional and needs no help or supervision to perform IADLs (using the telephone, shopping, preparing meals, housekeeping, doing laundry, using transportation, managing medications and managing finances) [Reports changes in hearing] : Reports no changes in hearing [Smoke Detector] : smoke detector [Carbon Monoxide Detector] : carbon monoxide detector [Guns at Home] : no guns at home [Seat Belt] :  uses seat belt [Sunscreen] : uses sunscreen [MammogramDate] : 01/2022 [BoneDensityDate] : 1/121 [ColonoscopyDate] : 01/2002

## 2023-09-04 LAB
25(OH)D3 SERPL-MCNC: 32.2 NG/ML
ALBUMIN SERPL ELPH-MCNC: 4.4 G/DL
ALP BLD-CCNC: 88 U/L
ALT SERPL-CCNC: 37 U/L
ANION GAP SERPL CALC-SCNC: 11 MMOL/L
APPEARANCE: CLEAR
AST SERPL-CCNC: 48 U/L
BACTERIA: NEGATIVE /HPF
BILIRUB DIRECT SERPL-MCNC: 0.1 MG/DL
BILIRUB SERPL-MCNC: 0.4 MG/DL
BILIRUBIN URINE: NEGATIVE
BLOOD URINE: NEGATIVE
BUN SERPL-MCNC: 21 MG/DL
CALCIUM SERPL-MCNC: 9.5 MG/DL
CAST: 0 /LPF
CHLORIDE SERPL-SCNC: 102 MMOL/L
CHOLEST SERPL-MCNC: 192 MG/DL
CO2 SERPL-SCNC: 29 MMOL/L
COLOR: YELLOW
CREAT SERPL-MCNC: 0.64 MG/DL
EGFR: 86 ML/MIN/1.73M2
EPITHELIAL CELLS: 0 /HPF
ESTIMATED AVERAGE GLUCOSE: 120 MG/DL
FERRITIN SERPL-MCNC: 100 NG/ML
GLUCOSE QUALITATIVE U: NEGATIVE MG/DL
GLUCOSE SERPL-MCNC: 92 MG/DL
HBA1C MFR BLD HPLC: 5.8 %
HCV AB SER QL: NONREACTIVE
HCV S/CO RATIO: 0.08 S/CO
HDLC SERPL-MCNC: 97 MG/DL
IRON SERPL-MCNC: 75 UG/DL
KETONES URINE: NEGATIVE MG/DL
LDLC SERPL CALC-MCNC: 87 MG/DL
LEUKOCYTE ESTERASE URINE: ABNORMAL
MICROSCOPIC-UA: NORMAL
NITRITE URINE: NEGATIVE
NONHDLC SERPL-MCNC: 95 MG/DL
PH URINE: 7
POTASSIUM SERPL-SCNC: 4.2 MMOL/L
PROT SERPL-MCNC: 6.4 G/DL
PROTEIN URINE: NEGATIVE MG/DL
RED BLOOD CELLS URINE: 2 /HPF
SODIUM SERPL-SCNC: 142 MMOL/L
SPECIFIC GRAVITY URINE: 1.01
T4 SERPL-MCNC: 6.1 UG/DL
TRIGL SERPL-MCNC: 39 MG/DL
TSH SERPL-ACNC: 1.37 UIU/ML
UROBILINOGEN URINE: 0.2 MG/DL
VIT B12 SERPL-MCNC: 528 PG/ML
WHITE BLOOD CELLS URINE: 2 /HPF

## 2023-09-07 ENCOUNTER — NON-APPOINTMENT (OUTPATIENT)
Age: 88
End: 2023-09-07

## 2023-09-07 DIAGNOSIS — R73.03 PREDIABETES.: ICD-10-CM

## 2023-09-11 ENCOUNTER — NON-APPOINTMENT (OUTPATIENT)
Age: 88
End: 2023-09-11

## 2023-09-15 ENCOUNTER — NON-APPOINTMENT (OUTPATIENT)
Age: 88
End: 2023-09-15

## 2023-10-13 ENCOUNTER — APPOINTMENT (OUTPATIENT)
Dept: NEUROLOGY | Facility: CLINIC | Age: 88
End: 2023-10-13
Payer: SELF-PAY

## 2023-10-13 PROCEDURE — SNS01: CPT

## 2023-10-18 ENCOUNTER — APPOINTMENT (OUTPATIENT)
Dept: NEUROLOGY | Facility: CLINIC | Age: 88
End: 2023-10-18
Payer: MEDICARE

## 2023-10-18 VITALS
WEIGHT: 101 LBS | DIASTOLIC BLOOD PRESSURE: 72 MMHG | BODY MASS INDEX: 19.07 KG/M2 | HEIGHT: 61 IN | HEART RATE: 84 BPM | SYSTOLIC BLOOD PRESSURE: 134 MMHG | TEMPERATURE: 97.8 F

## 2023-10-18 DIAGNOSIS — M54.12 RADICULOPATHY, CERVICAL REGION: ICD-10-CM

## 2023-10-18 DIAGNOSIS — R25.1 TREMOR, UNSPECIFIED: ICD-10-CM

## 2023-10-18 DIAGNOSIS — R26.89 OTHER ABNORMALITIES OF GAIT AND MOBILITY: ICD-10-CM

## 2023-10-18 PROCEDURE — 99214 OFFICE O/P EST MOD 30 MIN: CPT

## 2023-11-09 ENCOUNTER — RESULT REVIEW (OUTPATIENT)
Age: 88
End: 2023-11-09

## 2023-11-09 ENCOUNTER — APPOINTMENT (OUTPATIENT)
Dept: ULTRASOUND IMAGING | Facility: CLINIC | Age: 88
End: 2023-11-09
Payer: MEDICARE

## 2023-11-09 ENCOUNTER — OUTPATIENT (OUTPATIENT)
Dept: OUTPATIENT SERVICES | Facility: HOSPITAL | Age: 88
LOS: 1 days | End: 2023-11-09
Payer: MEDICARE

## 2023-11-09 ENCOUNTER — APPOINTMENT (OUTPATIENT)
Dept: MAMMOGRAPHY | Facility: CLINIC | Age: 88
End: 2023-11-09
Payer: MEDICARE

## 2023-11-09 DIAGNOSIS — Z00.8 ENCOUNTER FOR OTHER GENERAL EXAMINATION: ICD-10-CM

## 2023-11-09 DIAGNOSIS — E01.1 IODINE-DEFICIENCY RELATED MULTINODULAR (ENDEMIC) GOITER: ICD-10-CM

## 2023-11-09 PROCEDURE — 77063 BREAST TOMOSYNTHESIS BI: CPT

## 2023-11-09 PROCEDURE — 76641 ULTRASOUND BREAST COMPLETE: CPT

## 2023-11-09 PROCEDURE — 76641 ULTRASOUND BREAST COMPLETE: CPT | Mod: 26,50,GY

## 2023-11-09 PROCEDURE — 77063 BREAST TOMOSYNTHESIS BI: CPT | Mod: 26

## 2023-11-09 PROCEDURE — 77067 SCR MAMMO BI INCL CAD: CPT | Mod: 26

## 2023-11-09 PROCEDURE — 77067 SCR MAMMO BI INCL CAD: CPT

## 2023-11-28 ENCOUNTER — APPOINTMENT (OUTPATIENT)
Dept: GERIATRICS | Facility: CLINIC | Age: 88
End: 2023-11-28
Payer: MEDICARE

## 2023-11-28 VITALS
HEART RATE: 90 BPM | BODY MASS INDEX: 18.6 KG/M2 | RESPIRATION RATE: 18 BRPM | OXYGEN SATURATION: 99 % | DIASTOLIC BLOOD PRESSURE: 80 MMHG | HEIGHT: 61 IN | TEMPERATURE: 97.3 F | SYSTOLIC BLOOD PRESSURE: 146 MMHG | WEIGHT: 98.5 LBS

## 2023-11-28 DIAGNOSIS — M54.16 RADICULOPATHY, LUMBAR REGION: ICD-10-CM

## 2023-11-28 DIAGNOSIS — R26.81 UNSTEADINESS ON FEET: ICD-10-CM

## 2023-11-28 PROCEDURE — 99204 OFFICE O/P NEW MOD 45 MIN: CPT

## 2024-02-12 ENCOUNTER — APPOINTMENT (OUTPATIENT)
Dept: GERIATRICS | Facility: CLINIC | Age: 89
End: 2024-02-12
Payer: MEDICARE

## 2024-02-12 VITALS
RESPIRATION RATE: 15 BRPM | TEMPERATURE: 97.3 F | SYSTOLIC BLOOD PRESSURE: 164 MMHG | DIASTOLIC BLOOD PRESSURE: 74 MMHG | BODY MASS INDEX: 18.14 KG/M2 | OXYGEN SATURATION: 99 % | WEIGHT: 96 LBS | HEART RATE: 88 BPM
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
## 2024-02-12 DIAGNOSIS — R60.9 EDEMA, UNSPECIFIED: ICD-10-CM

## 2024-02-12 DIAGNOSIS — R29.898 OTHER SYMPTOMS AND SIGNS INVOLVING THE MUSCULOSKELETAL SYSTEM: ICD-10-CM

## 2024-02-12 DIAGNOSIS — R01.1 CARDIAC MURMUR, UNSPECIFIED: ICD-10-CM

## 2024-02-12 PROCEDURE — 99214 OFFICE O/P EST MOD 30 MIN: CPT

## 2024-02-12 PROCEDURE — G2211 COMPLEX E/M VISIT ADD ON: CPT

## 2024-02-12 NOTE — HISTORY OF PRESENT ILLNESS
[Any fall with injury in past year] : Patient reported fall with injury in the past year [Completely Independent] : Completely independent. [FreeTextEntry1] : The patient is an 88-year-old woman who was seen as an initial visit by myself on November 28, 2023.  The patient lives alone.  She is independent in all of her ADLs.  The patient's chief concern has been gait and several falls.  She is status post neurologic evaluation.  An MRI of the brain revealed microvascular changes.  She was also diagnosed with lumbar radiculopathy and cervical radiculopathy.. She was in [facility] in December. While there she fell and was diagnosed with severe DJD of sundar hip. The pt is relocating to [facility] in March.  The pt has noted mild edema left > right last few weeks

## 2024-02-12 NOTE — PHYSICAL EXAM
[Alert] : alert [Normal S1, S2] : normal S1 and S2 [Normal] : normal skin color and pigmentation [No Focal Deficits] : no focal deficits [Oriented To Time, Place, And Person] : oriented to person, place, and time [de-identified] : 3/6 holosys murmur [de-identified] : trace edema left > right [de-identified] : wide based gait

## 2024-02-12 NOTE — ASSESSMENT
[FreeTextEntry1] : The patient is a delightful 88-year-old woman.  Her chief concern is unsteady gait with several falls.  She is status post neurologic evaluation.  The patient is planning to move to [place] in 6 weeks.  On examination she continues to have a harsh systolic murmur.  There is also new trace to 1+ edema left greater than right.  Stasis seems most likely however valvular heart disease is possible.  The patient will follow-up with her cardiologist.  She also shows signs of parkinsonism.  She has been worked up in the past for Parkinson disease and was felt not to have Parkinson's disease.

## 2024-02-21 ENCOUNTER — INPATIENT (INPATIENT)
Facility: HOSPITAL | Age: 89
LOS: 2 days | Discharge: HOME HEALTH | DRG: 57 | End: 2024-02-24
Attending: INTERNAL MEDICINE | Admitting: STUDENT IN AN ORGANIZED HEALTH CARE EDUCATION/TRAINING PROGRAM
Payer: MEDICARE

## 2024-02-21 VITALS
RESPIRATION RATE: 17 BRPM | WEIGHT: 100.09 LBS | TEMPERATURE: 98 F | HEART RATE: 110 BPM | DIASTOLIC BLOOD PRESSURE: 71 MMHG | OXYGEN SATURATION: 99 % | SYSTOLIC BLOOD PRESSURE: 135 MMHG | HEIGHT: 62 IN

## 2024-02-21 LAB
ALBUMIN SERPL ELPH-MCNC: 4 G/DL — SIGNIFICANT CHANGE UP (ref 3.3–5)
ALP SERPL-CCNC: 66 U/L — SIGNIFICANT CHANGE UP (ref 40–120)
ALT FLD-CCNC: 23 U/L — SIGNIFICANT CHANGE UP (ref 10–45)
ANION GAP SERPL CALC-SCNC: 12 MMOL/L — SIGNIFICANT CHANGE UP (ref 5–17)
APTT BLD: 24.4 SEC — LOW (ref 24.5–35.6)
AST SERPL-CCNC: 25 U/L — SIGNIFICANT CHANGE UP (ref 10–40)
BASOPHILS # BLD AUTO: 0.04 K/UL — SIGNIFICANT CHANGE UP (ref 0–0.2)
BASOPHILS NFR BLD AUTO: 0.6 % — SIGNIFICANT CHANGE UP (ref 0–2)
BILIRUB SERPL-MCNC: 0.4 MG/DL — SIGNIFICANT CHANGE UP (ref 0.2–1.2)
BUN SERPL-MCNC: 17 MG/DL — SIGNIFICANT CHANGE UP (ref 7–23)
CALCIUM SERPL-MCNC: 9.5 MG/DL — SIGNIFICANT CHANGE UP (ref 8.4–10.5)
CHLORIDE SERPL-SCNC: 103 MMOL/L — SIGNIFICANT CHANGE UP (ref 96–108)
CO2 SERPL-SCNC: 26 MMOL/L — SIGNIFICANT CHANGE UP (ref 22–31)
CREAT SERPL-MCNC: 0.66 MG/DL — SIGNIFICANT CHANGE UP (ref 0.5–1.3)
EGFR: 85 ML/MIN/1.73M2 — SIGNIFICANT CHANGE UP
EOSINOPHIL # BLD AUTO: 0.04 K/UL — SIGNIFICANT CHANGE UP (ref 0–0.5)
EOSINOPHIL NFR BLD AUTO: 0.6 % — SIGNIFICANT CHANGE UP (ref 0–6)
GLUCOSE SERPL-MCNC: 116 MG/DL — HIGH (ref 70–99)
HCT VFR BLD CALC: 32.9 % — LOW (ref 34.5–45)
HGB BLD-MCNC: 10.3 G/DL — LOW (ref 11.5–15.5)
IMM GRANULOCYTES NFR BLD AUTO: 0.3 % — SIGNIFICANT CHANGE UP (ref 0–0.9)
INR BLD: 1.02 RATIO — SIGNIFICANT CHANGE UP (ref 0.85–1.18)
LYMPHOCYTES # BLD AUTO: 0.97 K/UL — LOW (ref 1–3.3)
LYMPHOCYTES # BLD AUTO: 14.4 % — SIGNIFICANT CHANGE UP (ref 13–44)
MAGNESIUM SERPL-MCNC: 2 MG/DL — SIGNIFICANT CHANGE UP (ref 1.6–2.6)
MCHC RBC-ENTMCNC: 27.6 PG — SIGNIFICANT CHANGE UP (ref 27–34)
MCHC RBC-ENTMCNC: 31.3 GM/DL — LOW (ref 32–36)
MCV RBC AUTO: 88.2 FL — SIGNIFICANT CHANGE UP (ref 80–100)
MONOCYTES # BLD AUTO: 0.51 K/UL — SIGNIFICANT CHANGE UP (ref 0–0.9)
MONOCYTES NFR BLD AUTO: 7.6 % — SIGNIFICANT CHANGE UP (ref 2–14)
NEUTROPHILS # BLD AUTO: 5.17 K/UL — SIGNIFICANT CHANGE UP (ref 1.8–7.4)
NEUTROPHILS NFR BLD AUTO: 76.5 % — SIGNIFICANT CHANGE UP (ref 43–77)
NRBC # BLD: 0 /100 WBCS — SIGNIFICANT CHANGE UP (ref 0–0)
NT-PROBNP SERPL-SCNC: 294 PG/ML — SIGNIFICANT CHANGE UP (ref 0–300)
PHOSPHATE SERPL-MCNC: 3.6 MG/DL — SIGNIFICANT CHANGE UP (ref 2.5–4.5)
PLATELET # BLD AUTO: 192 K/UL — SIGNIFICANT CHANGE UP (ref 150–400)
POTASSIUM SERPL-MCNC: 4.1 MMOL/L — SIGNIFICANT CHANGE UP (ref 3.5–5.3)
POTASSIUM SERPL-SCNC: 4.1 MMOL/L — SIGNIFICANT CHANGE UP (ref 3.5–5.3)
PROT SERPL-MCNC: 6.3 G/DL — SIGNIFICANT CHANGE UP (ref 6–8.3)
PROTHROM AB SERPL-ACNC: 11.2 SEC — SIGNIFICANT CHANGE UP (ref 9.5–13)
RBC # BLD: 3.73 M/UL — LOW (ref 3.8–5.2)
RBC # FLD: 14.6 % — HIGH (ref 10.3–14.5)
SODIUM SERPL-SCNC: 141 MMOL/L — SIGNIFICANT CHANGE UP (ref 135–145)
TROPONIN T, HIGH SENSITIVITY RESULT: 16 NG/L — SIGNIFICANT CHANGE UP (ref 0–51)
TROPONIN T, HIGH SENSITIVITY RESULT: 18 NG/L — SIGNIFICANT CHANGE UP (ref 0–51)
TSH SERPL-MCNC: 1.58 UIU/ML — SIGNIFICANT CHANGE UP (ref 0.27–4.2)
WBC # BLD: 6.75 K/UL — SIGNIFICANT CHANGE UP (ref 3.8–10.5)
WBC # FLD AUTO: 6.75 K/UL — SIGNIFICANT CHANGE UP (ref 3.8–10.5)

## 2024-02-21 PROCEDURE — 73552 X-RAY EXAM OF FEMUR 2/>: CPT | Mod: 26,LT

## 2024-02-21 PROCEDURE — 70450 CT HEAD/BRAIN W/O DYE: CPT | Mod: 26,MA

## 2024-02-21 PROCEDURE — 99223 1ST HOSP IP/OBS HIGH 75: CPT | Mod: FS

## 2024-02-21 PROCEDURE — 72125 CT NECK SPINE W/O DYE: CPT | Mod: 26,MA

## 2024-02-21 PROCEDURE — 72170 X-RAY EXAM OF PELVIS: CPT | Mod: 26,XE

## 2024-02-21 PROCEDURE — 73502 X-RAY EXAM HIP UNI 2-3 VIEWS: CPT | Mod: 26,LT

## 2024-02-21 RX ORDER — ACETAMINOPHEN 500 MG
675 TABLET ORAL ONCE
Refills: 0 | Status: COMPLETED | OUTPATIENT
Start: 2024-02-21 | End: 2024-02-22

## 2024-02-21 RX ORDER — ACETAMINOPHEN 500 MG
975 TABLET ORAL ONCE
Refills: 0 | Status: COMPLETED | OUTPATIENT
Start: 2024-02-21 | End: 2024-02-21

## 2024-02-21 RX ORDER — IBUPROFEN 200 MG
600 TABLET ORAL ONCE
Refills: 0 | Status: COMPLETED | OUTPATIENT
Start: 2024-02-21 | End: 2024-02-21

## 2024-02-21 RX ADMIN — Medication 600 MILLIGRAM(S): at 21:20

## 2024-02-21 RX ADMIN — Medication 600 MILLIGRAM(S): at 21:50

## 2024-02-21 RX ADMIN — Medication 975 MILLIGRAM(S): at 15:59

## 2024-02-21 NOTE — ED CDU PROVIDER INITIAL DAY NOTE - CLINICAL SUMMARY MEDICAL DECISION MAKING FREE TEXT BOX
holosystolic murmur here w/ fall and lost balance, pt w/ mild hip pain, shuffling gait, plan to cdu for echo and PT

## 2024-02-21 NOTE — ED CDU PROVIDER INITIAL DAY NOTE - PHYSICAL EXAMINATION
Physical Exam:  General: NAD, Conversive  Eyes: EOMI, Conjunctiva and sclera clear  Neck: supple  Lungs: Clear to auscultation bilaterally, no wheeze, no rhonchi  Heart: 2+ systolic murmur  Abdomen: Soft, nontender, nondistended, no CVA tenderness  Extremities: no calf swelling, moving all extremities well. + Mild pain over L. hip. ambulating steady without pain.   Psych: AAO X3

## 2024-02-21 NOTE — ED PROVIDER NOTE - CLINICAL SUMMARY MEDICAL DECISION MAKING FREE TEXT BOX
88 Y F presenting with near syncope 2/2 likely aortic murmur, gait instability likely 2/2 developing parkinsonism, will evaluate for hip fracture, ct head/neck, likely obs for echo/PT

## 2024-02-21 NOTE — ED ADULT NURSE NOTE - NSFALLHARMRISKINTERV_ED_ALL_ED
Assistance OOB with selected safe patient handling equipment if applicable/Communicate risk of Fall with Harm to all staff, patient, and family/Monitor gait and stability/Provide patient with walking aids/Provide visual cue: red socks, yellow wristband, yellow gown, etc/Reinforce activity limits and safety measures with patient and family/Bed in lowest position, wheels locked, appropriate side rails in place/Call bell, personal items and telephone in reach/Instruct patient to call for assistance before getting out of bed/chair/stretcher/Non-slip footwear applied when patient is off stretcher/[place] to call system/Physically safe environment - no spills, clutter or unnecessary equipment/Purposeful Proactive Rounding/Room/bathroom lighting operational, light cord in reach

## 2024-02-21 NOTE — ED ADULT NURSE NOTE - DRUG PRE-SCREENING (DAST -1)
Infant discharged home in stable condition with parent(s).  Infant carried out in car seat in mom's lap.  Mother has discharge instructions in hand. Mother given bottles of formula, instructed to supplement after every feeding. Voiced understanding. Follow up appointment with Dr. Martinez already made for tomorrow. Parents voiced no questions or concerns at this time.    Statement Selected

## 2024-02-21 NOTE — ED PROVIDER NOTE - PHYSICAL EXAMINATION
Physical Exam:  General: NAD, Conversive  Eyes: EOMI, Conjunctiva and sclera clear  Neck: No JVD  Lungs: Clear to auscultation bilaterally, no wheeze, no rhonchi  Heart: 2+ systolic murmur  Abdomen: Soft, nontender, nondistended, no CVA tenderness  Extremities: 2+ peripheral pulses, no calf swelling, + Mild pain over L. hip  Psych: AAO X3  Neurologic: Non-focal, no weakness, no loss of sensation

## 2024-02-21 NOTE — ED PROVIDER NOTE - ATTENDING CONTRIBUTION TO CARE
88 F w/ hx of basal cell carcinoma, Factor V leiden, heart murmur presents to the ER w/ frequent falls, pt had a fall last night and injured her R hip was able to stand up reports hitting head but no loc, she states that she came today due to concern that she has freq falls, her PCP DR. Oneil recommended ER due to heart murmur. pt reports she was told she has parkinsons like syndrome. Pt reports that she is relocating to [place] in March. no cp, no sob, no leg swelling. she states she was eval for possible hip replacement in [place] but was told she didn't qualify. I have reviewed the triage vital signs. Const: no acute distress, Well-developed, Eyes: no conjunctival injection and no scleral icterus ENMT: Moist mucus membranes, CVS: +S1/S2, radial/DP pulse 2+ bilaterally  RESP: Unlabored respiratory effort, Clear to auscultation bilaterally GI: Nontender/Nondistended soft abdomen, no CVA tenderness MSK: Extremities w/o deformity or ttp, Psych: Awake, Alert, & Orientedx3;  Appropriate mood and affect, cooperative  Pt to have labs imaging and reassessment given freq falls has a loud systolic ejection murmur, pt w/ possible symptomatic aortic stenosis, which could be contributing to her falls, pt to have echo

## 2024-02-21 NOTE — ED ADULT TRIAGE NOTE - CHIEF COMPLAINT QUOTE
pt c/o L hip pain s/p mechanical trip and fall last night   pt now endorsing lightheadedness and palpitations  pt denies head strike, h/a

## 2024-02-21 NOTE — ED ADULT NURSE NOTE - ED STAT RN HANDOFF DETAILS
1100 Report given to receiving change of shift LURDES CAO patient is in no acute distress. Patient vital signs stable, plan of care explained.

## 2024-02-21 NOTE — ED CDU PROVIDER INITIAL DAY NOTE - PROGRESS NOTE DETAILS
Patient evaluated at bedside and placed on tele. A+Ox3. Complains of L hip pain. No rash or visible bruising to area. Moving all extremities. Not due for Tylenol, will give Motrin. She states at home she takes Tylenol and Advil for pain. Denies history of bleeding issues/disorders. - Rody Roman PA-C

## 2024-02-21 NOTE — ED PROVIDER NOTE - PROGRESS NOTE DETAILS
Beny Mcclelland MD:  Xray negative, will obs for echo in setting of likely symptomatic aortic stenosis

## 2024-02-21 NOTE — ED ADULT NURSE NOTE - OBJECTIVE STATEMENT
87yo F aaox4 with no significant PMHx, presents to ED from home, as per pt today at 0100 while doing "few things in the kitchen: lost a balance and fell on the L side of he body striking on the L hip, denies hit the head, LOC. able to get up from the floor on her own, and this morning had an episode of palpitations and lightheadedness: resolved , at this time c/o L hip pain , Pt denies CP, SOB, HA, vision changes, n/v/d, fevers chills, abdominal pain, Safety and comfort measures initiated- bed placed in lowest position and side rails raised.

## 2024-02-21 NOTE — ED PROVIDER NOTE - OBJECTIVE STATEMENT
88 Y F H/O recently diagnosed parkinson's disease, worsening murmur presenting with traumatic fall. States yesterday fell while unsteady, potential head trauma,  + primary complaint of L. hip pain, worsening dizziness upon standing and near syncope. Denies any abdominal pain, dysuria, fever, chills, nausea, vomiting, chest pain, difficulty breathing, + shuffling gait, micrographia, no visual hallucinations, weakness.

## 2024-02-21 NOTE — ED CDU PROVIDER INITIAL DAY NOTE - OBJECTIVE STATEMENT
88 Y F H/O recently diagnosed parkinson's disease, +cardiac murmur presenting with traumatic fall. States fell last night due to "loss of balance", unsure about head injury though she states she fell backwards so potential head trauma,  + primary complaint of L. hip pain. pt states didn't sleep well because of hip pain. Then this morning had dizziness upon standing and near syncope. also palpitations. pt also with c/o 10 days of ankle swelling b/l. uses cane at home.   Denies any abdominal pain, dysuria, fever, chills, nausea, vomiting, chest pain, difficulty breathing, + shuffling gait, micrographia, no visual hallucinations, weakness.

## 2024-02-22 ENCOUNTER — RESULT REVIEW (OUTPATIENT)
Age: 89
End: 2024-02-22

## 2024-02-22 DIAGNOSIS — D64.9 ANEMIA, UNSPECIFIED: ICD-10-CM

## 2024-02-22 DIAGNOSIS — R29.818 OTHER SYMPTOMS AND SIGNS INVOLVING THE NERVOUS SYSTEM: ICD-10-CM

## 2024-02-22 DIAGNOSIS — Z29.9 ENCOUNTER FOR PROPHYLACTIC MEASURES, UNSPECIFIED: ICD-10-CM

## 2024-02-22 DIAGNOSIS — I38 ENDOCARDITIS, VALVE UNSPECIFIED: ICD-10-CM
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
## 2024-02-22 DIAGNOSIS — Z98.890 OTHER SPECIFIED POSTPROCEDURAL STATES: Chronic | ICD-10-CM

## 2024-02-22 DIAGNOSIS — R26.81 UNSTEADINESS ON FEET: ICD-10-CM

## 2024-02-22 PROCEDURE — 99223 1ST HOSP IP/OBS HIGH 75: CPT

## 2024-02-22 PROCEDURE — 99233 SBSQ HOSP IP/OBS HIGH 50: CPT | Mod: FS

## 2024-02-22 PROCEDURE — 93306 TTE W/DOPPLER COMPLETE: CPT | Mod: 26

## 2024-02-22 RX ORDER — ACETAMINOPHEN 500 MG
650 TABLET ORAL EVERY 6 HOURS
Refills: 0 | Status: DISCONTINUED | OUTPATIENT
Start: 2024-02-22 | End: 2024-02-23

## 2024-02-22 RX ORDER — HEPARIN SODIUM 5000 [USP'U]/ML
5000 INJECTION INTRAVENOUS; SUBCUTANEOUS EVERY 8 HOURS
Refills: 0 | Status: DISCONTINUED | OUTPATIENT
Start: 2024-02-22 | End: 2024-02-24

## 2024-02-22 RX ADMIN — Medication 650 MILLIGRAM(S): at 15:53

## 2024-02-22 RX ADMIN — Medication 270 MILLIGRAM(S): at 05:24

## 2024-02-22 NOTE — CONSULT NOTE ADULT - SUBJECTIVE AND OBJECTIVE BOX
DATE OF SERVICE: 02-22-24      CHIEF COMPLAINT:Patient is a 88y old  Female who presents with a chief complaint of Falls (22 Feb 2024 14:29)      HISTORY OF PRESENT ILLNESS:HPI:  The patient is a 88F with h/o recently diagnosed parkinson's syndrome, pericardiocentesis in 2019 at [place], worsening heart murmur came to ED because she fell at the kitchen while coming to bed, turned little fast, lost balance. She landed on hip and hit L hip but not head, no LOC, chest pain, palpitations. About 6 days ago she fell also and lost balance too. Denies taking any new medication. She noticed her leg gets swell-up time to time but no SOB. She has been going to her cardiologist- Dr. Castillo at [facility] for her heart valve problems.  Recently she has seen a neurologist but imaging was neg fro Parkinsons. She was not prescribe any medications. She lives alone, does groceries and cooking. Lately it has been difficult so decided to go back to [facility] ion April with her daughter sabrina lives in [facility].  (22 Feb 2024 14:29)      PAST MEDICAL & SURGICAL HISTORY:  Arthralgia      Parkinsons      H/O valvular heart disease      S/P pericardiocentesis              MEDICATIONS:  heparin   Injectable 5000 Unit(s) SubCutaneous every 8 hours    acetaminophen     Tablet .. 650 milliGRAM(s) Oral every 6 hours PRN        FAMILY HISTORY:      Non-contributory    SOCIAL HISTORY:    [ ] not a smoker    Allergies    latex (Other)  No Known Drug Allergies    Intolerances    	    REVIEW OF SYSTEMS:  CONSTITUTIONAL: No fever  EYES: No eye pain, visual disturbances, or discharge  ENMT:  No difficulty hearing, tinnitus  NECK: No pain or stiffness  RESPIRATORY: No cough, wheezing,  CARDIOVASCULAR: No chest pain, palpitations, passing out, dizziness, or leg swelling  GASTROINTESTINAL:  No nausea, vomiting, diarrhea or constipation. No melena.  GENITOURINARY: No dysuria, hematuria  NEUROLOGICAL: No stroke like symptoms  SKIN: No burning or lesions   ENDOCRINE: No heat or cold intolerance  MUSCULOSKELETAL: No joint pain or swelling  PSYCHIATRIC: No  anxiety, mood swings  HEME/LYMPH: No bleeding gums  ALLERGY AND IMMUNOLOGIC: No hives or eczema	    All other ROS negative    PHYSICAL EXAM:  T(C): 37.1 (02-22-24 @ 20:18), Max: 37.1 (02-22-24 @ 20:18)  HR: 74 (02-22-24 @ 20:18) (68 - 85)  BP: 141/79 (02-22-24 @ 20:18) (116/74 - 149/72)  RR: 18 (02-22-24 @ 20:18) (18 - 18)  SpO2: 97% (02-22-24 @ 20:18) (97% - 100%)  Wt(kg): --  I&O's Summary      Appearance: Normal	  HEENT:   Normal oral mucosa, EOMI	  Cardiovascular:  S1 S2, No JVD,    Respiratory: Lungs clear to auscultation	  Psychiatry: Alert  Gastrointestinal:  Soft, Non-tender, + BS	  Skin: No rashes   Neurologic: Non-focal  Extremities:  No edema  Vascular: Peripheral pulses palpable    	    	  	  CARDIAC MARKERS:  Labs personally reviewed by me                                  10.3   6.75  )-----------( 192      ( 21 Feb 2024 11:13 )             32.9     02-21    141  |  103  |  17  ----------------------------<  116<H>  4.1   |  26  |  0.66    Ca    9.5      21 Feb 2024 11:13  Phos  3.6     02-21  Mg     2.0     02-21    TPro  6.3  /  Alb  4.0  /  TBili  0.4  /  DBili  x   /  AST  25  /  ALT  23  /  AlkPhos  66  02-21          EKG: Personally reviewed by me - NSR      Assessment/Plan:  88F with h/o recently diagnosed parkinson's syndrome, pericardiocentesis in 2019 at [place], worsening heart murmur came to ED because she fell at the kitchen while coming to bed, turned little fast, lost balance. She landed on hip and hit L hip but not head, no LOC, chest pain, palpitations. About 6 days ago she fell also and lost balance too. Denies taking any new medication. She noticed her leg gets swell-up time to time but no SOB. She has been going to her cardiologist- Dr. Castillo at [facility] for her heart valve problems.  Recently she has seen a neurologist but imaging was neg fro Parkinsons. She was not prescribe any medications. She lives alone, does groceries and cooking. Lately it has been difficult.    Problem/Plan - 1:  ·  Problem: Falls.   ·  Plan: Multifactorial- appear to be more 2/2 loss of balance 2/2 parkinson's and frailty less likely arrhythmogenic or cardiogenic   - will monitor on tele x 24 hours to r/o arrythmia     Problem/Plan - 2:  ·  Problem: Valvular heart disease.   ·  Plan: Echo shows preserved EF with mod-severe AS  - Followed at [facility] for cardiology  - Was evaluated for TAVR at [facility] 9/2020 by Dr Graff and at the time valve was deemed moderately stenotic and annual surveillance was planned   - Will discuss with pt/Family again if she wishes to follow here or [facility] for TAVR eval    Problem/Plan - 3:  ·  Problem: Parkinsonian features.   ·  Plan: Recently visited neurologist, MRI Brain and DaTscan were unremarkable  - OP FU with neurology    Problem/Plan - 4:  ·  Problem: Encounter for deep vein thrombosis (DVT) prophylaxis.   ·  Plan: Heparin sc.          Differential diagnosis and plan of care discussed with patient after the evaluation. Counseling on diet, nutritional counseling, weight management, exercise and medication compliance was done.   Advanced care planning/advanced directives discussed with patient/family. DNR status including forceful chest compressions to attempt to restart the heart, ventilator support/artificial breathing, electric shock, artificial nutrition, health care proxy, Molst form all discussed with pt. Pt wishes to consider. Sixteen minutes spent on discussing advanced directives.           Jordan Colindres DO [facility]  Cardiovascular Medicine  [address], [address]  Office [phone]  Available via call/text on [employer] Teams

## 2024-02-22 NOTE — H&P ADULT - PROBLEM SELECTOR PLAN 4
Heparin sc Hb 10.3, no c/o melena or GI blood loss, had colonoscopy years ago  - Iron studies, Vit B12, Folate ordered

## 2024-02-22 NOTE — H&P ADULT - NSHPSOCIALHISTORY_GEN_ALL_CORE
Lives alone, retired ,  20 years, has a daughter in [place]  Denies smoking cigarettes, drinking alcohol

## 2024-02-22 NOTE — PHYSICAL THERAPY INITIAL EVALUATION ADULT - ADDITIONAL COMMENTS
h/o frequent falls. Patient lives alone in pvt house, 1 step  to enter. h/o frequent falls.  Patient ambulated with straight cane independent. pt owns no dme s at home.

## 2024-02-22 NOTE — PHYSICAL THERAPY INITIAL EVALUATION ADULT - TRANSFER SAFETY CONCERNS NOTED: SIT/STAND, REHAB EVAL
mild posterior lean noted./decreased safety awareness/losing balance/decreased step length/decreased weight-shifting ability

## 2024-02-22 NOTE — ED CDU PROVIDER SUBSEQUENT DAY NOTE - HISTORY
No interval changes since initial CDU provider note. Pt without new complaint. NAD VSS. No events on tele. Plan for echocardiogram s/p fall. PT consult pending. - HARSHIL Roman

## 2024-02-22 NOTE — ED CDU PROVIDER DISPOSITION NOTE - ATTENDING APP SHARED VISIT CONTRIBUTION OF CARE
I performed a history and physical exam of the patient and discussed their management with the Advanced Care Practitioner. I reviewed the ACP's note and agree with the documented findings and plan of care. My medical decision making and observations are found above.  patient with fall. lightheaded/palpitations prior. tele monitoring, echo.

## 2024-02-22 NOTE — ED CDU PROVIDER SUBSEQUENT DAY NOTE - PROGRESS NOTE DETAILS
Pt with steady gait with walker. - NATALYA OvallesC patient seen and evaluated at bedside this morning. resting comfortably. offers no medical complaints at this time. VSS. pending TTE. will continue to monitor. xray shows no fracture to hip/pelvis. would like to obtain CT of area as patient states still has intermittent pain but able to ambulate. patient declined CT at this time. states not agreeable to have a CT. discussed with Dr. Payne. patient seen by PT. will admit patient to medicine for rehab placement as patient lives alone and has had multiple falls (spoke with patients daughter who reports patient falls multiple times). TTE reviewed. spoke with Dr. Mendez. states will see patient inpatient. discussed with Dr. Payne.

## 2024-02-22 NOTE — H&P ADULT - ASSESSMENT
The patient is a 88F with h/o recently diagnosed parkinson's syndrome, pericardiocentesis in 2019 at [place], worsening heart murmur came to ED because she fell at the kitchen while coming to bed, turned little fast, lost balance. She landed on hip and hit L hip but not head, no LOC, chest pain, palpitations. About 6 days ago she fell also and lost balance too. Denies taking any new medication. She noticed her leg gets swell-up time to time but no SOB. She has been going to her cardiologist- Dr. Castillo at [facility] for her heart valve problems.  Recently she has seen a neurologist but imaging was neg fro Parkinsons. She was not prescribe any medications. She lives alone, does groceries and cooking. Lately it has been difficult.    In ED her VSS, CT brain, C-spine, X-rays are unremarkable, Labs also looks ok except Hb 10.3, Echo shows- hyperdynamic EF 80% with Moderate AS and Moderate MR.

## 2024-02-22 NOTE — H&P ADULT - PROBLEM SELECTOR PLAN 1
Multifactorial- Vasovagal/parkinson's/valvular heart disease with arrythmia. Fell twice this week, lives alone  - Vitals stable, exam looks euvolemia  - Tele, PT, fall precaution  - Cardiology consult called Multifactorial- Vasovagal/parkinson's/valvular heart disease with arrythmia. Fell twice this week, lives alone  - Vitals stable, exam looks euvolemia  - Tele, orthostatic vitals, fall precaution  - Cardiology consult called  - PT rec- ELISE

## 2024-02-22 NOTE — ED CDU PROVIDER SUBSEQUENT DAY NOTE - PHYSICAL EXAMINATION
General: NAD, Conversive  	Eyes: EOMI, Conjunctiva and sclera clear  	Neck: supple  	Lungs: Clear to auscultation bilaterally, no wheeze, no rhonchi  	Heart: 2+ systolic murmur  	Abdomen: Soft, nontender, nondistended, no CVA tenderness  	Extremities: no calf swelling, moving all extremities well. + Mild pain over L. hip. ambulating steady without pain.   Psych: AAO X3

## 2024-02-22 NOTE — H&P ADULT - PROBLEM SELECTOR PLAN 2
Echo shows-   Left ventricular systolic function is hyperdynamic with an ejection fraction of 80 %, there are no regional wall motion abnormalities seen. There is mild (grade 1) left ventricular diastolic dysfunction.  This is an incomplete study for evaluation of aortic stenosis. There is fusion of the non-coronary and left coronary cusps. Visually, the aortic valve appears severely stenotic but the ADELINA is moderate in severity and the hemodynamic data is not supportive of significant aortic stenosis. There is conflicting hemodynamic data and ADELINA.  moderate mitral valve stenosis. The transmitral peak gradient is 14.1 mmHg and mean gradient is 6.00 mmHg at a heart rate of 70 bpm.  - Awaiting on Cardiology plans, may need Structural Team eval. She wanted eval here rather than [facility]

## 2024-02-22 NOTE — PHYSICAL THERAPY INITIAL EVALUATION ADULT - STRENGTHENING, PT EVAL
none
GOAL: Pt will improve b/l  (UE/LE) strength by at least 1/2 MMT grade within 2-3 weeks to assist with performing functional mobility and ADLs.

## 2024-02-22 NOTE — ED CDU PROVIDER SUBSEQUENT DAY NOTE - CLINICAL SUMMARY MEDICAL DECISION MAKING FREE TEXT BOX
[place]: Patient with left sided buttock pain s/p fall. reports felt lightheaded prior to fall. in cdu no events on tele. walking with walker. at baseline walks with cane. Patient also reports continued left sided buttock pain. will c/w tele monitoring, echo, ct imaging of hip to r/o any frature, PT eval pending. will have social work consult on patient to evaluate for home care services as patient lives alone.   PE: att exam: patient awake alert NAD.nonlabored respraitons, + s1s2, .  Abdomen soft NT ND no rebound no guarding no CVA tenderness. EXT: left buttock ttp, pelvis stable. no bony defomrity, b/l le equal length. + 2 dp b/l le. skin intact.

## 2024-02-22 NOTE — H&P ADULT - PROBLEM SELECTOR PLAN 3
Recently visited neurologist, MRI Brain and DaTscan were unremarkable  - CT brain, C-spine and kemal neck look unremarkable

## 2024-02-22 NOTE — PHYSICAL THERAPY INITIAL EVALUATION ADULT - PERTINENT HX OF CURRENT PROBLEM, REHAB EVAL
88 Y F H/O recently diagnosed parkinson's disease, worsening murmur presenting with traumatic fall. States yesterday fell while unsteady, potential head trauma,  + primary complaint of L. hip pain, worsening dizziness upon standing and near syncope. Pelvis- No acute fx/dislocation, Hip L - no acute fx dislocation. CT c spine- No acute fx dislocation.  Head CT: No acute intracranial hemorrhage, mass effect, or shift of the midline structures. 88 Y F H/O recently diagnosed parkinson's disease, worsening murmur presenting with traumatic fall. States yesterday fell while unsteady, potential head trauma,  + primary complaint of L. hip pain, worsening dizziness upon standing and near syncope. Pelvis- No acute fx/dislocation, Hip L - no acute fx dislocation. CT c spine- No acute fx dislocation.  Head CT: No acute intracranial hemorrhage, mass effect, or shift of the midline structures. Pt to have labs imaging and reassessment given freq falls has a loud systolic ejection murmur, pt w/ possible symptomatic aortic stenosis, which could be contributing to her falls, pt to have echo.

## 2024-02-22 NOTE — H&P ADULT - NEUROLOGICAL
AAO x3, nonfocal/cranial nerves II-XII intact/sensation intact/responds to pain/responds to verbal commands/deep reflexes intact/cranial nerves intact/superficial reflexes intact details…

## 2024-02-22 NOTE — H&P ADULT - HISTORY OF PRESENT ILLNESS
The patient is a 88F with h/o recently diagnosed parkinson's syndrome, pericardiocentesis in 2019 at [place], worsening heart murmur came to ED because she fell at the kitchen while coming to bed, turned little fast, lost balance. She landed on hip and hit L hip but not head, no LOC, chest pain, palpitations. About 6 days ago she fell also and lost balance too. Denies taking any new medication. She noticed her leg gets swell-up time to time but no SOB. She has been going to her cardiologist- Dr. Castillo at [facility] for her heart valve problems.  Recently she has seen a neurologist but imaging was neg fro Parkinsons. She was not prescribe any medications. She lives alone, does groceries and cooking. Lately it has been difficult so decided to go back to [place] with her daughter sabrina lives in [facility].

## 2024-02-22 NOTE — ED ADULT NURSE REASSESSMENT NOTE - NS ED NURSE REASSESS COMMENT FT1
At this time pt is unsure if she wants Heparin 5,000 SQ, pt educated. HARSHIL Moe aware.
Pt received from CRICKET Healy. Pt oriented to CDU & plan of care was discussed. Pt A&O x 4. Pt in CDU for frequent reeval, vitals per routine, tele, echo, pain control PRN. Pt denies any chest pain, SOB, dizziness or palpitations as of now. Pt on a cardiac monitor in NSR, HR in 80's. V/S stable, pt afebrile,  IV in place, patent and free of signs of infiltration. Pt resting in bed. Safety & comfort measures maintained. Call bell in reach. Will continue to monitor.

## 2024-02-22 NOTE — ED CDU PROVIDER DISPOSITION NOTE - CLINICAL COURSE
88 Y F H/O recently diagnosed parkinson's disease, +cardiac murmur presenting with traumatic fall. States fell last night due to "loss of balance", unsure about head injury though she states she fell backwards so potential head trauma,  + primary complaint of L. hip pain. pt states didn't sleep well because of hip pain. Then this morning had dizziness upon standing and near syncope. also palpitations. pt also with c/o 10 days of ankle swelling b/l. uses cane at home. Denies any abdominal pain, dysuria, fever, chills, nausea, vomiting, chest pain, difficulty breathing, + shuffling gait, micrographia, no visual hallucinations, weakness.  ED course: XR/CT negative for acute pathology. Physical exam concern for murmur. Plan for tele monitoring, echocardiogram, and PT consult in CDU. 88 Y F H/O recently diagnosed parkinson's disease, +cardiac murmur presenting with traumatic fall. States fell last night due to "loss of balance", unsure about head injury though she states she fell backwards so potential head trauma,  + primary complaint of L. hip pain. pt states didn't sleep well because of hip pain. Then this morning had dizziness upon standing and near syncope. also palpitations. pt also with c/o 10 days of ankle swelling b/l. uses cane at home. Denies any abdominal pain, dysuria, fever, chills, nausea, vomiting, chest pain, difficulty breathing, + shuffling gait, micrographia, no visual hallucinations, weakness.  ED course: XR/CT negative for acute pathology. Physical exam concern for murmur. Plan for tele monitoring, echocardiogram, and PT consult in CDU.  CDU course: patient had TTE done and seen by PT. will admit patient to medicine for rehab placement as well as cardiology eval. discussed with Dr. Payne.

## 2024-02-22 NOTE — ED CDU PROVIDER DISPOSITION NOTE - NSFOLLOWUPINSTRUCTIONS_ED_ALL_ED_FT
Hydrate.     Please take Tylenol 650mg every 6 hours as needed for pain.      We recommend you follow up with your primary care provider within the next 2-3 days, please bring all of your results with you.     Please return to the Emergency Department with new, worsening, or concerning symptoms, such as:  -Shortness of breath or trouble breathing  -Pressure, pain, tightness in chest  -Facial drooping, arm weakness, or speech difficulty   -Head injury or loss of consciousness   -Nonstop bleeding or an open wound     *More detailed information regarding your visit and discharge can be found by reviewing this packet

## 2024-02-23 LAB
A1C WITH ESTIMATED AVERAGE GLUCOSE RESULT: 5.6 % — SIGNIFICANT CHANGE UP (ref 4–5.6)
ANION GAP SERPL CALC-SCNC: 11 MMOL/L — SIGNIFICANT CHANGE UP (ref 5–17)
BUN SERPL-MCNC: 11 MG/DL — SIGNIFICANT CHANGE UP (ref 7–23)
CALCIUM SERPL-MCNC: 8.8 MG/DL — SIGNIFICANT CHANGE UP (ref 8.4–10.5)
CHLORIDE SERPL-SCNC: 105 MMOL/L — SIGNIFICANT CHANGE UP (ref 96–108)
CO2 SERPL-SCNC: 26 MMOL/L — SIGNIFICANT CHANGE UP (ref 22–31)
CREAT SERPL-MCNC: 0.55 MG/DL — SIGNIFICANT CHANGE UP (ref 0.5–1.3)
EGFR: 89 ML/MIN/1.73M2 — SIGNIFICANT CHANGE UP
ESTIMATED AVERAGE GLUCOSE: 114 MG/DL — SIGNIFICANT CHANGE UP (ref 68–114)
FERRITIN SERPL-MCNC: 113 NG/ML — SIGNIFICANT CHANGE UP (ref 13–330)
FOLATE SERPL-MCNC: 9.8 NG/ML — SIGNIFICANT CHANGE UP
GLUCOSE SERPL-MCNC: 82 MG/DL — SIGNIFICANT CHANGE UP (ref 70–99)
IRON SATN MFR SERPL: 19 % — SIGNIFICANT CHANGE UP (ref 14–50)
IRON SATN MFR SERPL: 39 UG/DL — SIGNIFICANT CHANGE UP (ref 30–160)
POTASSIUM SERPL-MCNC: 3.3 MMOL/L — LOW (ref 3.5–5.3)
POTASSIUM SERPL-SCNC: 3.3 MMOL/L — LOW (ref 3.5–5.3)
SODIUM SERPL-SCNC: 142 MMOL/L — SIGNIFICANT CHANGE UP (ref 135–145)
TIBC SERPL-MCNC: 209 UG/DL — LOW (ref 220–430)
TSH SERPL-MCNC: 1.58 UIU/ML — SIGNIFICANT CHANGE UP (ref 0.27–4.2)
UIBC SERPL-MCNC: 170 UG/DL — SIGNIFICANT CHANGE UP (ref 110–370)
VIT B12 SERPL-MCNC: 309 PG/ML — SIGNIFICANT CHANGE UP (ref 232–1245)

## 2024-02-23 PROCEDURE — 99232 SBSQ HOSP IP/OBS MODERATE 35: CPT

## 2024-02-23 RX ORDER — PREGABALIN 225 MG/1
1000 CAPSULE ORAL DAILY
Refills: 0 | Status: DISCONTINUED | OUTPATIENT
Start: 2024-02-23 | End: 2024-02-24

## 2024-02-23 RX ORDER — ACETAMINOPHEN 500 MG
975 TABLET ORAL EVERY 8 HOURS
Refills: 0 | Status: DISCONTINUED | OUTPATIENT
Start: 2024-02-23 | End: 2024-02-24

## 2024-02-23 RX ORDER — POTASSIUM CHLORIDE 20 MEQ
40 PACKET (EA) ORAL ONCE
Refills: 0 | Status: COMPLETED | OUTPATIENT
Start: 2024-02-23 | End: 2024-02-23

## 2024-02-23 RX ADMIN — PREGABALIN 1000 MICROGRAM(S): 225 CAPSULE ORAL at 17:57

## 2024-02-23 RX ADMIN — Medication 650 MILLIGRAM(S): at 00:45

## 2024-02-23 RX ADMIN — Medication 40 MILLIEQUIVALENT(S): at 17:57

## 2024-02-23 RX ADMIN — Medication 650 MILLIGRAM(S): at 00:11

## 2024-02-23 NOTE — PROGRESS NOTE ADULT - PROBLEM SELECTOR PLAN 1
Multifactorial- Vasovagal/parkinson's/valvular heart disease with arrythmia. Fell twice this week, lives alone  - Vitals stable, exam looks euvolemia  - Tele, orthostatic vitals, fall precaution  - Cardiology recs appreciated  - PT rec- [facility] but pt wants HPT  - f/u L femur xray  - Standing tylenol X 3 days

## 2024-02-23 NOTE — PROGRESS NOTE ADULT - PROBLEM SELECTOR PLAN 4
Hb 10.3, no c/o melena or GI blood loss, had colonoscopy years ago  - Iron studies, Vit B12, Folate ordered

## 2024-02-23 NOTE — PROGRESS NOTE ADULT - NSPROGADDITIONALINFOA_GEN_ALL_CORE
DCP to [facility] but pt wants home with HPT 24hr aide self pay set up. Pt declines me calling her daughter at this time    40 minutes spent on total encounter. The necessity of the time spent during the encounter on this date of service was due to:     - preparing to see the patient (eg, review of tests, documents)  - performing a medically appropriate examination and/or evaluation  - referring and communicating with other health care professionals  - documenting clinical information in the electronic or other health record  - care coordination.

## 2024-02-23 NOTE — PROGRESS NOTE ADULT - SUBJECTIVE AND OBJECTIVE BOX
[facility] Division of Hospital Medicine  Angela Kohli MD  AVailable on TEAMS 8a-8p    Patient is a 88y old  Female who presents with a chief complaint of Falls (23 Feb 2024 14:43)      SUBJECTIVE / OVERNIGHT EVENTS: No acute events  ADDITIONAL REVIEW OF SYSTEMS: L thigh pain    MEDICATIONS  (STANDING):  heparin   Injectable 5000 Unit(s) SubCutaneous every 8 hours    MEDICATIONS  (PRN):  acetaminophen     Tablet .. 650 milliGRAM(s) Oral every 6 hours PRN Temp greater or equal to 38C (100.4F), Moderate Pain (4 - 6)      CAPILLARY BLOOD GLUCOSE        I&O's Summary    23 Feb 2024 07:01  -  23 Feb 2024 16:18  --------------------------------------------------------  IN: 560 mL / OUT: 550 mL / NET: 10 mL        PHYSICAL EXAM:  Vital Signs Last 24 Hrs  T(C): 36.5 (23 Feb 2024 11:22), Max: 37.1 (22 Feb 2024 20:18)  T(F): 97.7 (23 Feb 2024 11:22), Max: 98.8 (22 Feb 2024 20:18)  HR: 101 (23 Feb 2024 11:22) (74 - 101)  BP: 139/80 (23 Feb 2024 11:22) (116/74 - 141/79)  BP(mean): --  RR: 18 (23 Feb 2024 11:22) (18 - 18)  SpO2: 97% (23 Feb 2024 11:22) (96% - 98%)    Parameters below as of 23 Feb 2024 11:22  Patient On (Oxygen Delivery Method): room air      CONSTITUTIONAL: NAD, well appearing elderly woman  EYES: PERRLA; conjunctiva and sclera clear  ENMT: Moist oral mucosa, no pharyngeal injection or exudates  NECK: Supple, no palpable masses; no thyromegaly  RESPIRATORY: Normal respiratory effort; lungs are clear to auscultation bilaterally  CARDIOVASCULAR: Regular rate and rhythm, normal S1 and S2, no murmur/rub/gallop; No lower extremity edema  ABDOMEN: Nontender to palpation, normoactive bowel sounds, no rebound/guarding; No hepatosplenomegaly  MUSCULOSKELETAL:  No clubbing or cyanosis of digits; no joint swelling or tenderness to palpation  PSYCH: A+O to person, place, and time; affect appropriate  NEUROLOGY: CN 2-12 are intact and symmetric; no gross sensory deficits   SKIN: No rashes; no palpable lesions    LABS: reviewed    02-23    142  |  105  |  11  ----------------------------<  82  3.3<L>   |  26  |  0.55    Ca    8.8      23 Feb 2024 07:19            Urinalysis Basic - ( 23 Feb 2024 07:19 )    Color: x / Appearance: x / SG: x / pH: x  Gluc: 82 mg/dL / Ketone: x  / Bili: x / Urobili: x   Blood: x / Protein: x / Nitrite: x   Leuk Esterase: x / RBC: x / WBC x   Sq Epi: x / Non Sq Epi: x / Bacteria: x

## 2024-02-23 NOTE — PROGRESS NOTE ADULT - ASSESSMENT
The patient is a 88F with h/o recently diagnosed parkinson's syndrome, pericardiocentesis in 2019 at [place], worsening heart murmur came to ED because she fell at the kitchen while coming to bed, turned little fast, lost balance. She landed on hip and hit L hip but not head, no LOC, chest pain, palpitations. About 6 days ago she fell also and lost balance too. Denies taking any new medication. She noticed her leg gets swell-up time to time but no SOB. She has been going to her cardiologist- Dr. Castillo at [facility] for her heart valve problems.  Recently she has seen a neurologist but imaging was neg fro Parkinsons. She was not prescribe any medications. She lives alone, does groceries and cooking. Lately it has been difficult.    In ED her VSS, CT brain, C-spine, X-rays are unremarkable, Labs also looks ok except Hb 10.3, Echo shows- hyperdynamic EF 80% with Moderate AS and Moderate MR.

## 2024-02-23 NOTE — PROGRESS NOTE ADULT - SUBJECTIVE AND OBJECTIVE BOX
DATE OF SERVICE: 02-23-24 @ 14:43    Patient is a 88y old  Female who presents with a chief complaint of Falls (22 Feb 2024 17:33)      INTERVAL HISTORY: Feels ok.     REVIEW OF SYSTEMS:  CONSTITUTIONAL: No weakness  EYES/ENT: No visual changes;  No throat pain   NECK: No pain or stiffness  RESPIRATORY: No cough, wheezing; No shortness of breath  CARDIOVASCULAR: No chest pain or palpitations  GASTROINTESTINAL: No abdominal  pain. No nausea, vomiting, or hematemesis  GENITOURINARY: No dysuria, frequency or hematuria  NEUROLOGICAL: No stroke like symptoms  SKIN: No rashes    TELEMETRY Personally reviewed:    	  MEDICATIONS:        PHYSICAL EXAM:  T(C): 36.5 (02-23-24 @ 11:22), Max: 37.1 (02-22-24 @ 20:18)  HR: 101 (02-23-24 @ 11:22) (74 - 101)  BP: 139/80 (02-23-24 @ 11:22) (116/74 - 141/79)  RR: 18 (02-23-24 @ 11:22) (18 - 18)  SpO2: 97% (02-23-24 @ 11:22) (96% - 98%)  Wt(kg): --  I&O's Summary    23 Feb 2024 07:01  -  23 Feb 2024 14:43  --------------------------------------------------------  IN: 100 mL / OUT: 0 mL / NET: 100 mL      Height (cm): 157.5 (02-22 @ 20:18)  Weight (kg): 50 (02-22 @ 20:18)  BMI (kg/m2): 20.2 (02-22 @ 20:18)  BSA (m2): 1.48 (02-22 @ 20:18)    Appearance: In no distress	  HEENT:    PERRL, EOMI	  Cardiovascular:  S1 S2, No JVD, + murmur  Respiratory: Lungs clear to auscultation	  Gastrointestinal:  Soft, Non-tender, + BS	  Vascularature:  No edema of LE  Psychiatric: Appropriate affect   Neuro: no acute focal deficits           02-23    142  |  105  |  11  ----------------------------<  82  3.3<L>   |  26  |  0.55    Ca    8.8      23 Feb 2024 07:19          Labs personally reviewed      ASSESSMENT/PLAN: 	    88F with h/o recently diagnosed parkinson's syndrome, pericardiocentesis in 2019 at [place], worsening heart murmur came to ED because she fell at the kitchen while coming to bed, turned little fast, lost balance. She landed on hip and hit L hip but not head, no LOC, chest pain, palpitations. About 6 days ago she fell also and lost balance too. Denies taking any new medication. She noticed her leg gets swell-up time to time but no SOB. She has been going to her cardiologist- Dr. Castillo at [facility] for her heart valve problems.  Recently she has seen a neurologist but imaging was neg fro Parkinsons. She was not prescribe any medications. She lives alone, does groceries and cooking. Lately it has been difficult.    Problem/Plan - 1:  ·  Problem: Falls.   ·  Plan: Multifactorial- appear to be more 2/2 loss of balance 2/2 parkinson's and frailty less likely arrhythmogenic or cardiogenic   - will monitor on tele x 24 hours to r/o arrythmia     Problem/Plan - 2:  ·  Problem: Valvular heart disease.   ·  Plan: Echo shows preserved EF with mod-severe AS  - Followed at [facility] for cardiology  - Was evaluated for TAVR at [facility] 9/2020 by Dr Graff and at the time valve was deemed moderately stenotic and annual surveillance was planned   - Will discuss with pt/Family again if she wishes to follow here or [facility] for TAVR eval    Problem/Plan - 3:  ·  Problem: Parkinsonian features.   ·  Plan: Recently visited neurologist, MRI Brain and DaTscan were unremarkable  - OP FU with neurology    Problem/Plan - 4:  ·  Problem: Encounter for deep vein thrombosis (DVT) prophylaxis.   ·  Plan: Heparin sc.        Qiana Ram, AG-NP   Jordan Colindres DO [facility]  Cardiovascular Medicine  [address] [facility], [address]  Available through call or text on [employer] TEAMs  Office: [phone]   DATE OF SERVICE: 02-23-24 @ 14:43    Patient is a 88y old  Female who presents with a chief complaint of Falls (22 Feb 2024 17:33)      INTERVAL HISTORY: Feels ok.     REVIEW OF SYSTEMS:  CONSTITUTIONAL: No weakness  EYES/ENT: No visual changes;  No throat pain   NECK: No pain or stiffness  RESPIRATORY: No cough, wheezing; No shortness of breath  CARDIOVASCULAR: No chest pain or palpitations  GASTROINTESTINAL: No abdominal  pain. No nausea, vomiting, or hematemesis  GENITOURINARY: No dysuria, frequency or hematuria  NEUROLOGICAL: No stroke like symptoms  SKIN: No rashes    TELEMETRY Personally reviewed:    	  MEDICATIONS:        PHYSICAL EXAM:  T(C): 36.5 (02-23-24 @ 11:22), Max: 37.1 (02-22-24 @ 20:18)  HR: 101 (02-23-24 @ 11:22) (74 - 101)  BP: 139/80 (02-23-24 @ 11:22) (116/74 - 141/79)  RR: 18 (02-23-24 @ 11:22) (18 - 18)  SpO2: 97% (02-23-24 @ 11:22) (96% - 98%)  Wt(kg): --  I&O's Summary    23 Feb 2024 07:01  -  23 Feb 2024 14:43  --------------------------------------------------------  IN: 100 mL / OUT: 0 mL / NET: 100 mL      Height (cm): 157.5 (02-22 @ 20:18)  Weight (kg): 50 (02-22 @ 20:18)  BMI (kg/m2): 20.2 (02-22 @ 20:18)  BSA (m2): 1.48 (02-22 @ 20:18)    Appearance: In no distress	  HEENT:    PERRL, EOMI	  Cardiovascular:  S1 S2, No JVD, + murmur  Respiratory: Lungs clear to auscultation	  Gastrointestinal:  Soft, Non-tender, + BS	  Vascularature:  No edema of LE  Psychiatric: Appropriate affect   Neuro: no acute focal deficits           02-23    142  |  105  |  11  ----------------------------<  82  3.3<L>   |  26  |  0.55    Ca    8.8      23 Feb 2024 07:19          Labs personally reviewed      ASSESSMENT/PLAN: 	    88F with h/o recently diagnosed parkinson's syndrome, pericardiocentesis in 2019 at [place], worsening heart murmur came to ED because she fell at the kitchen while coming to bed, turned little fast, lost balance. She landed on hip and hit L hip but not head, no LOC, chest pain, palpitations. About 6 days ago she fell also and lost balance too. Denies taking any new medication. She noticed her leg gets swell-up time to time but no SOB. She has been going to her cardiologist- Dr. Castillo at [facility] for her heart valve problems.  Recently she has seen a neurologist but imaging was neg fro Parkinsons. She was not prescribe any medications. She lives alone, does groceries and cooking. Lately it has been difficult.    Problem/Plan - 1:  ·  Problem: Falls.   ·  Plan: Multifactorial- appear to be more 2/2 loss of balance 2/2 parkinson's and frailty less likely arrhythmogenic or cardiogenic   - will monitor on tele x 24 hours to r/o arrythmia     Problem/Plan - 2:  ·  Problem: Valvular heart disease.   ·  Plan: Echo shows preserved EF with mod-severe AS  - Followed at [facility] for cardiology  - Was evaluated for TAVR at [facility] 9/2020 by Dr Graff and at the time valve was deemed moderately stenotic and annual surveillance was planned   - Discussed with structural heart, follow at [facility] for TAVR eval    Problem/Plan - 3:  ·  Problem: Parkinsonian features.   ·  Plan: Recently visited neurologist, MRI Brain and DaTscan were unremarkable  - OP FU with neurology    Problem/Plan - 4:  ·  Problem: Encounter for deep vein thrombosis (DVT) prophylaxis.   ·  Plan: Heparin sc.        Qiana Ram, JANEE-NP   Jordan Colindres,  [facility]  Cardiovascular Medicine  [address], [address]  Available through call or text on [employer] TEAMs  Office: [phone]

## 2024-02-24 ENCOUNTER — TRANSCRIPTION ENCOUNTER (OUTPATIENT)
Age: 89
End: 2024-02-24

## 2024-02-24 VITALS
RESPIRATION RATE: 18 BRPM | TEMPERATURE: 98 F | DIASTOLIC BLOOD PRESSURE: 68 MMHG | HEART RATE: 85 BPM | OXYGEN SATURATION: 96 % | SYSTOLIC BLOOD PRESSURE: 119 MMHG

## 2024-02-24 LAB
ANION GAP SERPL CALC-SCNC: 11 MMOL/L — SIGNIFICANT CHANGE UP (ref 5–17)
BUN SERPL-MCNC: 14 MG/DL — SIGNIFICANT CHANGE UP (ref 7–23)
CALCIUM SERPL-MCNC: 9.6 MG/DL — SIGNIFICANT CHANGE UP (ref 8.4–10.5)
CHLORIDE SERPL-SCNC: 106 MMOL/L — SIGNIFICANT CHANGE UP (ref 96–108)
CO2 SERPL-SCNC: 26 MMOL/L — SIGNIFICANT CHANGE UP (ref 22–31)
CREAT SERPL-MCNC: 0.62 MG/DL — SIGNIFICANT CHANGE UP (ref 0.5–1.3)
EGFR: 86 ML/MIN/1.73M2 — SIGNIFICANT CHANGE UP
GLUCOSE SERPL-MCNC: 97 MG/DL — SIGNIFICANT CHANGE UP (ref 70–99)
HCT VFR BLD CALC: 33.1 % — LOW (ref 34.5–45)
HGB BLD-MCNC: 10.5 G/DL — LOW (ref 11.5–15.5)
MCHC RBC-ENTMCNC: 27.3 PG — SIGNIFICANT CHANGE UP (ref 27–34)
MCHC RBC-ENTMCNC: 31.7 GM/DL — LOW (ref 32–36)
MCV RBC AUTO: 86.2 FL — SIGNIFICANT CHANGE UP (ref 80–100)
NRBC # BLD: 0 /100 WBCS — SIGNIFICANT CHANGE UP (ref 0–0)
PLATELET # BLD AUTO: 199 K/UL — SIGNIFICANT CHANGE UP (ref 150–400)
POTASSIUM SERPL-MCNC: 3.9 MMOL/L — SIGNIFICANT CHANGE UP (ref 3.5–5.3)
POTASSIUM SERPL-SCNC: 3.9 MMOL/L — SIGNIFICANT CHANGE UP (ref 3.5–5.3)
RBC # BLD: 3.84 M/UL — SIGNIFICANT CHANGE UP (ref 3.8–5.2)
RBC # FLD: 14.5 % — SIGNIFICANT CHANGE UP (ref 10.3–14.5)
SODIUM SERPL-SCNC: 143 MMOL/L — SIGNIFICANT CHANGE UP (ref 135–145)
WBC # BLD: 4.09 K/UL — SIGNIFICANT CHANGE UP (ref 3.8–10.5)
WBC # FLD AUTO: 4.09 K/UL — SIGNIFICANT CHANGE UP (ref 3.8–10.5)

## 2024-02-24 PROCEDURE — 84443 ASSAY THYROID STIM HORMONE: CPT

## 2024-02-24 PROCEDURE — 83540 ASSAY OF IRON: CPT

## 2024-02-24 PROCEDURE — 72170 X-RAY EXAM OF PELVIS: CPT

## 2024-02-24 PROCEDURE — 96374 THER/PROPH/DIAG INJ IV PUSH: CPT

## 2024-02-24 PROCEDURE — 70450 CT HEAD/BRAIN W/O DYE: CPT | Mod: MA

## 2024-02-24 PROCEDURE — 99285 EMERGENCY DEPT VISIT HI MDM: CPT | Mod: 25

## 2024-02-24 PROCEDURE — 82607 VITAMIN B-12: CPT

## 2024-02-24 PROCEDURE — 83880 ASSAY OF NATRIURETIC PEPTIDE: CPT

## 2024-02-24 PROCEDURE — 84100 ASSAY OF PHOSPHORUS: CPT

## 2024-02-24 PROCEDURE — 97116 GAIT TRAINING THERAPY: CPT

## 2024-02-24 PROCEDURE — 85027 COMPLETE CBC AUTOMATED: CPT

## 2024-02-24 PROCEDURE — 83550 IRON BINDING TEST: CPT

## 2024-02-24 PROCEDURE — 73552 X-RAY EXAM OF FEMUR 2/>: CPT

## 2024-02-24 PROCEDURE — 99239 HOSP IP/OBS DSCHRG MGMT >30: CPT

## 2024-02-24 PROCEDURE — 80053 COMPREHEN METABOLIC PANEL: CPT

## 2024-02-24 PROCEDURE — 72125 CT NECK SPINE W/O DYE: CPT | Mod: MA

## 2024-02-24 PROCEDURE — 83735 ASSAY OF MAGNESIUM: CPT

## 2024-02-24 PROCEDURE — 82746 ASSAY OF FOLIC ACID SERUM: CPT

## 2024-02-24 PROCEDURE — 85610 PROTHROMBIN TIME: CPT

## 2024-02-24 PROCEDURE — 85025 COMPLETE CBC W/AUTO DIFF WBC: CPT

## 2024-02-24 PROCEDURE — 93306 TTE W/DOPPLER COMPLETE: CPT

## 2024-02-24 PROCEDURE — 83036 HEMOGLOBIN GLYCOSYLATED A1C: CPT

## 2024-02-24 PROCEDURE — 82728 ASSAY OF FERRITIN: CPT

## 2024-02-24 PROCEDURE — 80048 BASIC METABOLIC PNL TOTAL CA: CPT

## 2024-02-24 PROCEDURE — 97530 THERAPEUTIC ACTIVITIES: CPT

## 2024-02-24 PROCEDURE — 84484 ASSAY OF TROPONIN QUANT: CPT

## 2024-02-24 PROCEDURE — 73502 X-RAY EXAM HIP UNI 2-3 VIEWS: CPT

## 2024-02-24 PROCEDURE — 97161 PT EVAL LOW COMPLEX 20 MIN: CPT

## 2024-02-24 PROCEDURE — 85730 THROMBOPLASTIN TIME PARTIAL: CPT

## 2024-02-24 RX ORDER — PREGABALIN 225 MG/1
1 CAPSULE ORAL
Qty: 30 | Refills: 0
Start: 2024-02-24 | End: 2024-03-24

## 2024-02-24 RX ORDER — OXYCODONE HYDROCHLORIDE 5 MG/1
0.5 TABLET ORAL
Qty: 10 | Refills: 0
Start: 2024-02-24 | End: 2024-02-28

## 2024-02-24 RX ORDER — ACETAMINOPHEN 500 MG
2 TABLET ORAL
Qty: 0 | Refills: 0 | DISCHARGE

## 2024-02-24 RX ADMIN — Medication 975 MILLIGRAM(S): at 05:24

## 2024-02-24 RX ADMIN — HEPARIN SODIUM 5000 UNIT(S): 5000 INJECTION INTRAVENOUS; SUBCUTANEOUS at 06:37

## 2024-02-24 RX ADMIN — PREGABALIN 1000 MICROGRAM(S): 225 CAPSULE ORAL at 13:32

## 2024-02-24 NOTE — PROGRESS NOTE ADULT - SUBJECTIVE AND OBJECTIVE BOX
DATE OF SERVICE: 02-24-24 @ 11:34    Patient is a 88y old  Female who presents with a chief complaint of Falls (23 Feb 2024 16:18)      INTERVAL HISTORY: no complaints    REVIEW OF SYSTEMS:  CONSTITUTIONAL: No weakness  EYES/ENT: No visual changes;  No throat pain   NECK: No pain or stiffness  RESPIRATORY: No cough, wheezing; No shortness of breath  CARDIOVASCULAR: No chest pain or palpitations  GASTROINTESTINAL: No abdominal  pain. No nausea, vomiting, or hematemesis  GENITOURINARY: No dysuria, frequency or hematuria  NEUROLOGICAL: No stroke like symptoms  SKIN: No rashes      MEDICATIONS:        PHYSICAL EXAM:  T(C): 36.6 (02-24-24 @ 09:47), Max: 36.9 (02-24-24 @ 04:48)  HR: 73 (02-24-24 @ 04:48) (71 - 73)  BP: 106/63 (02-24-24 @ 04:48) (106/63 - 125/70)  RR: 18 (02-24-24 @ 09:47) (18 - 18)  SpO2: 99% (02-24-24 @ 09:47) (97% - 99%)  Wt(kg): --  I&O's Summary    23 Feb 2024 07:01  -  24 Feb 2024 07:00  --------------------------------------------------------  IN: 800 mL / OUT: 550 mL / NET: 250 mL          Appearance: In no distress	  HEENT:    PERRL, EOMI	  Cardiovascular:  S1 S2, No JVD  Respiratory: Lungs clear to auscultation	  Gastrointestinal:  Soft, Non-tender, + BS	  Vascularature:  No edema of LE  Psychiatric: Appropriate affect   Neuro: no acute focal deficits                               10.5   4.09  )-----------( 199      ( 24 Feb 2024 06:58 )             33.1     02-24    143  |  106  |  14  ----------------------------<  97  3.9   |  26  |  0.62    Ca    9.6      24 Feb 2024 06:58          Labs personally reviewed      ASSESSMENT/PLAN: 	      88F with h/o recently diagnosed parkinson's syndrome, pericardiocentesis in 2019 at [place], worsening heart murmur came to ED because she fell at the kitchen while coming to bed, turned little fast, lost balance. She landed on hip and hit L hip but not head, no LOC, chest pain, palpitations. About 6 days ago she fell also and lost balance too. Denies taking any new medication. She noticed her leg gets swell-up time to time but no SOB. She has been going to her cardiologist- Dr. Castillo at [facility] for her heart valve problems.  Recently she has seen a neurologist but imaging was neg fro Parkinsons. She was not prescribe any medications. She lives alone, does groceries and cooking. Lately it has been difficult.    Problem/Plan - 1:  ·  Problem: Falls.   ·  Plan: Multifactorial- appear to be more 2/2 loss of balance 2/2 parkinson's and frailty less likely arrhythmogenic or cardiogenic   - will monitor on tele x 24 hours to r/o arrythmia   - Mostly NSR on tele with few secs (2-4 secs) of sinus tach and PAT up to 140 beats.     Problem/Plan - 2:  ·  Problem: Valvular heart disease.   ·  Plan: Echo shows preserved EF with mod-severe AS  - Followed at [facility] for cardiology  - Was evaluated for TAVR at [facility] 9/2020 by Dr Graff and at the time valve was deemed moderately stenotic and annual surveillance was planned   - Discussed with structural heart, follow at [facility] for TAVR eval    Problem/Plan - 3:  ·  Problem: Parkinsonian features.   ·  Plan: Recently visited neurologist, MRI Brain and DaTscan were unremarkable  - OP FU with neurology    Problem/Plan - 4:  ·  Problem: Encounter for deep vein thrombosis (DVT) prophylaxis.   ·  Plan: Heparin sc.              TOMAS Richter DO [facility]  Cardiovascular Medicine  [address], [address]  Office: [phone]  Available via call/text on [employer] Teams  DATE OF SERVICE: 02-24-24 @ 11:34    Patient is a 88y old  Female who presents with a chief complaint of Falls (23 Feb 2024 16:18)      INTERVAL HISTORY: no complaints    REVIEW OF SYSTEMS:  CONSTITUTIONAL: No weakness  EYES/ENT: No visual changes;  No throat pain   NECK: No pain or stiffness  RESPIRATORY: No cough, wheezing; No shortness of breath  CARDIOVASCULAR: No chest pain or palpitations  GASTROINTESTINAL: No abdominal  pain. No nausea, vomiting, or hematemesis  GENITOURINARY: No dysuria, frequency or hematuria  NEUROLOGICAL: No stroke like symptoms  SKIN: No rashes      MEDICATIONS:        PHYSICAL EXAM:  T(C): 36.6 (02-24-24 @ 09:47), Max: 36.9 (02-24-24 @ 04:48)  HR: 73 (02-24-24 @ 04:48) (71 - 73)  BP: 106/63 (02-24-24 @ 04:48) (106/63 - 125/70)  RR: 18 (02-24-24 @ 09:47) (18 - 18)  SpO2: 99% (02-24-24 @ 09:47) (97% - 99%)  Wt(kg): --  I&O's Summary    23 Feb 2024 07:01  -  24 Feb 2024 07:00  --------------------------------------------------------  IN: 800 mL / OUT: 550 mL / NET: 250 mL          Appearance: In no distress	  HEENT:    PERRL, EOMI	  Cardiovascular:  S1 S2, No JVD  Respiratory: Lungs clear to auscultation	  Gastrointestinal:  Soft, Non-tender, + BS	  Vascularature:  No edema of LE  Psychiatric: Appropriate affect   Neuro: no acute focal deficits                               10.5   4.09  )-----------( 199      ( 24 Feb 2024 06:58 )             33.1     02-24    143  |  106  |  14  ----------------------------<  97  3.9   |  26  |  0.62    Ca    9.6      24 Feb 2024 06:58          Labs personally reviewed      ASSESSMENT/PLAN: 	    88F with h/o recently diagnosed parkinson's syndrome, pericardiocentesis in 2019 at [place], worsening heart murmur came to ED because she fell at the kitchen while coming to bed, turned little fast, lost balance. She landed on hip and hit L hip but not head, no LOC, chest pain, palpitations. About 6 days ago she fell also and lost balance too. Denies taking any new medication. She noticed her leg gets swell-up time to time but no SOB. She has been going to her cardiologist- Dr. Castillo at [facility] for her heart valve problems.  Recently she has seen a neurologist but imaging was neg fro Parkinsons. She was not prescribe any medications. She lives alone, does groceries and cooking. Lately it has been difficult.    Problem/Plan - 1:  ·  Problem: Falls.   ·  Plan: Multifactorial- appear to be more 2/2 loss of balance 2/2 parkinson's and frailty less likely arrhythmogenic or cardiogenic   - will monitor on tele x 24 hours to r/o arrythmia   - Mostly NSR on tele with few secs (2-4 secs) of sinus tach and PAT up to 140 beats.     Problem/Plan - 2:  ·  Problem: Valvular heart disease.   ·  Plan: Echo shows preserved EF with mod-severe AS  - Followed at [facility] for cardiology  - Was evaluated for TAVR at [facility] 9/2020 by Dr Graff and at the time valve was deemed moderately stenotic and annual surveillance was planned   - Discussed with structural heart, follow at [facility] for TAVR eval    Problem/Plan - 3:  ·  Problem: Parkinsonian features.   ·  Plan: Recently visited neurologist, MRI Brain and DaTscan were unremarkable  - OP FU with neurology    Problem/Plan - 4:  ·  Problem: Encounter for deep vein thrombosis (DVT) prophylaxis.   ·  Plan: Heparin sc.              TOMAS Richter DO [facility]  Cardiovascular Medicine  [address], [address]  Office: [phone]  Available via call/text on [employer] Teams

## 2024-02-24 NOTE — DISCHARGE NOTE NURSING/CASE MANAGEMENT/SOCIAL WORK - PATIENT PORTAL LINK FT
You can access the FollowMyHealth Patient Portal offered by [facility] by registering at the following website: [email or web address]. By joining [facility]’s FollowMyHealth portal, you will also be able to view your health information using other applications (apps) compatible with our system.

## 2024-02-24 NOTE — DISCHARGE NOTE PROVIDER - NSFOLLOWUPCLINICS_GEN_ALL_ED_FT
[facility]  Orthopedic Surgery  [address] [address]  Phone: [phone]  Fax:   Follow Up Time: 1 week

## 2024-02-24 NOTE — DISCHARGE NOTE PROVIDER - NSDCFUSCHEDAPPT_GEN_ALL_CORE_FT
Marlyn Gagnon  [facility] Physician [facility]  NEUROLOGY [address]  Scheduled Appointment: 04/08/2024

## 2024-02-24 NOTE — DISCHARGE NOTE PROVIDER - NSDCCPCAREPLAN_GEN_ALL_CORE_FT
PRINCIPAL DISCHARGE DIAGNOSIS  Diagnosis: Falls  Assessment and Plan of Treatment: Fall prevention.   Home PT.      SECONDARY DISCHARGE DIAGNOSES  Diagnosis: Valvular heart disease  Assessment and Plan of Treatment: Follow up with Cardiology in 1 week regarding intervention.   Continue present medication regimen.    Diagnosis: Parkinsonian features  Assessment and Plan of Treatment: Continue present medication regimen.   Follow up with Neurologist outpt as scheduled.     PRINCIPAL DISCHARGE DIAGNOSIS  Diagnosis: Falls  Assessment and Plan of Treatment: Fall prevention.   Home PT.      SECONDARY DISCHARGE DIAGNOSES  Diagnosis: Valvular heart disease  Assessment and Plan of Treatment: Follow up with Cardiology in 1 week regarding intervention.   Continue present medication regimen.    Diagnosis: Parkinsonian features  Assessment and Plan of Treatment: Continue present medication regimen.   Follow up with Neurologist outpt as scheduled.    Diagnosis: Left hip pain  Assessment and Plan of Treatment: continue tylenol and oxycodone for severe pain  follow up at orthopedic clinic if pain dosent improve or worsens

## 2024-02-24 NOTE — DISCHARGE NOTE PROVIDER - ATTENDING DISCHARGE PHYSICAL EXAMINATION:
Vital Signs Last 24 Hrs  T(C): 36.7 (24 Feb 2024 11:37), Max: 36.9 (24 Feb 2024 04:48)  T(F): 98.1 (24 Feb 2024 11:37), Max: 98.4 (24 Feb 2024 04:48)  HR: 85 (24 Feb 2024 11:37) (71 - 85)  BP: 119/68 (24 Feb 2024 11:37) (106/63 - 125/70)  BP(mean): --  RR: 18 (24 Feb 2024 11:37) (18 - 18)  SpO2: 96% (24 Feb 2024 11:37) (96% - 99%)    Parameters below as of 24 Feb 2024 11:37  Patient On (Oxygen Delivery Method): room air    GENERAL: Frail appearing elderly woman, in NAD  EYES: EOMI, conjunctiva and sclera clear  ENT: moist mucosa, no pharyngeal erythema  NECK: supple, no JVD  LUNG: Clear to auscultation bilaterally; No rales, rhonchi, wheezing, or rubs.   CVS: Regular rate and rhythm; No murmurs, rubs, or gallops  ABDOMEN: Soft, non tender, nondistended. +Bowel sounds.  EXTREMITIES:  no edema, no cyanosis  NEURO:  Alert & Oriented X3,  No focal deficits  PSYCH: Normal affect, normal mood  MUSCULOSKELETAL: FROM, no joint swelling; pinpoint tenderness in L hip, no ecchymoses  Skin: warm and dry, normal color

## 2024-02-24 NOTE — DISCHARGE NOTE PROVIDER - CARE PROVIDER_API CALL
Luz Elena Moser  Internal Medicine  [address] [place] [id number]  Phone: [phone]  Fax: [phone]  Established Patient  Follow Up Time: 1 week

## 2024-02-24 NOTE — DISCHARGE NOTE NURSING/CASE MANAGEMENT/SOCIAL WORK - NSDCPEFALRISK_GEN_ALL_CORE
For information on Fall & Injury Prevention, visit: [email or web address] OR  [email or web address] OR  https://www.cdc.gov/steadi/patient.html

## 2024-02-24 NOTE — DISCHARGE NOTE PROVIDER - NSDCMRMEDTOKEN_GEN_ALL_CORE_FT
oxyCODONE 5 mg oral tablet: 0.5 tab(s) orally 4 times a day MDD: 2  Tylenol Caplet 325 mg oral tablet: 2 tab(s) orally every 8 hours STOP after 5 days

## 2024-02-24 NOTE — DISCHARGE NOTE PROVIDER - NSDCFUADDAPPT_GEN_ALL_CORE_FT
APPTS ARE READY TO BE MADE: [x ] YES    Best Family or Patient Contact (if needed):    Additional Information about above appointments (if needed):    1:   2:   3:     Other comments or requests:    APPTS ARE READY TO BE MADE: [x ] YES    Best Family or Patient Contact (if needed):    Additional Information about above appointments (if needed):    1:   2:   3:     Other comments or requests:   Patient was provided with referral details by phone or email for a [facility] provider and will coordinate the appointment on their own.

## 2024-02-24 NOTE — DISCHARGE NOTE PROVIDER - HOSPITAL COURSE
HPI:  The patient is a 88F with h/o recently diagnosed parkinson's syndrome, pericardiocentesis in 2019 at [place], worsening heart murmur came to ED because she fell at the kitchen while coming to bed, turned little fast, lost balance. She landed on hip and hit L hip but not head, no LOC, chest pain, palpitations. About 6 days ago she fell also and lost balance too. Denies taking any new medication. She noticed her leg gets swell-up time to time but no SOB. She has been going to her cardiologist- Dr. Castillo at [facility] for her heart valve problems.  Recently she has seen a neurologist but imaging was neg fro Parkinsons. She was not prescribe any medications. She lives alone, does groceries and cooking. Lately it has been difficult so decided to go back to [place] with her daughter sabrina lives in [facility].  (22 Feb 2024 14:29)    Hospital Course:      Important Medication Changes and Reason:    Active or Pending Issues Requiring Follow-up:    Advanced Directives:   [ ] Full code  [ ] DNR  [ ] Hospice    Discharge Diagnoses:         HPI:  The patient is a 88F with h/o recently diagnosed parkinson's syndrome, pericardiocentesis in 2019 at [place], worsening heart murmur came to ED because she fell at the kitchen while coming to bed, turned little fast, lost balance. She landed on hip and hit L hip but not head, no LOC, chest pain, palpitations. About 6 days ago she fell also and lost balance too. Denies taking any new medication. She noticed her leg gets swell-up time to time but no SOB. She has been going to her cardiologist- Dr. Castillo at [facility] for her heart valve problems.  Recently she has seen a neurologist but imaging was neg fro Parkinsons. She was not prescribe any medications. She lives alone, does groceries and cooking. Lately it has been difficult so decided to go back to [facility] [place] with her daughter sabrina lives in [facility].  (22 Feb 2024 14:29)    Hospital Course:   #Falls.   ·  Plan: Multifactorial- Vasovagal/parkinson's/valvular heart disease with arrythmia. Fell twice this week, lives alone  - Vitals stable, exam looks euvolemia  - fall precaution  - Cardiology consulted.   - PT rec- [facility] but pt wants HPT  - f/u L femur xray  - Standing tylenol X 3 days.    #Valvular heart disease.   ·  Plan: Echo shows-   Left ventricular systolic function is hyperdynamic with an ejection fraction of 80 %, there are no regional wall motion abnormalities seen. There is mild (grade 1) left ventricular diastolic dysfunction.  This is an incomplete study for evaluation of aortic stenosis. There is fusion of the non-coronary and left coronary cusps. Visually, the aortic valve appears severely stenotic but the ADELINA is moderate in severity and the hemodynamic data is not supportive of significant aortic stenosis. There is conflicting hemodynamic data and ADELINA.  moderate mitral valve stenosis. The transmitral peak gradient is 14.1 mmHg and mean gradient is 6.00 mmHg at a heart rate of 70 bpm.  - Pt will follow up with Dr. Graff at [facility] for a TAVR evaluation.     #Parkinsonian features.   ·  Plan: Recently visited neurologist, MRI Brain and DaTscan were unremarkable  - CT brain, C-spine and kemal neck look unremarkable.     #Anemia.   ·  Plan: Hb 10.3, no c/o melena or GI blood loss, had colonoscopy years ago  - Labs ordered.  -Pt can continue on vitamin b12 1000 mcg daily.    Important Medication Changes and Reason:  NA  Active or Pending Issues Requiring Follow-up:  Follow up with Cardiologist outpt re: intervention or treatment of cardiac valve.   Advanced Directives:   [x ] Full code  [ ] DNR  [ ] Hospice    Discharge Diagnoses:  Falls  Valvular Heart Disease  Parkinsonian Features  Anemia

## 2024-02-27 ENCOUNTER — NON-APPOINTMENT (OUTPATIENT)
Age: 89
End: 2024-02-27

## 2024-02-29 ENCOUNTER — NON-APPOINTMENT (OUTPATIENT)
Age: 89
End: 2024-02-29

## 2024-03-01 NOTE — CHART NOTE - NSCHARTNOTEFT_GEN_A_CORE
Family member answered on behalf of the patient and advised they will pass forward our details for the patient to return our call. Patient told caregiver to take our number and that she is fine but will call us back later. Will try once more tomorrow.
Rolling Walker:  Patient requires rolling walker due toG20.A1/ M25.50 for safe ambulation at discharge.
Patient was outreached but did not answer. A voicemail was left for the patient to return our call.  [phone]

## 2024-04-08 ENCOUNTER — APPOINTMENT (OUTPATIENT)
Dept: NEUROLOGY | Facility: CLINIC | Age: 89
End: 2024-04-08